# Patient Record
Sex: FEMALE | Race: WHITE | Employment: PART TIME | URBAN - METROPOLITAN AREA
[De-identification: names, ages, dates, MRNs, and addresses within clinical notes are randomized per-mention and may not be internally consistent; named-entity substitution may affect disease eponyms.]

---

## 2017-02-21 ENCOUNTER — OFFICE VISIT (OUTPATIENT)
Dept: CARDIOLOGY CLINIC | Age: 57
End: 2017-02-21

## 2017-02-21 VITALS
HEART RATE: 81 BPM | BODY MASS INDEX: 40.93 KG/M2 | OXYGEN SATURATION: 96 % | WEIGHT: 231 LBS | DIASTOLIC BLOOD PRESSURE: 68 MMHG | HEIGHT: 63 IN | SYSTOLIC BLOOD PRESSURE: 112 MMHG

## 2017-02-21 DIAGNOSIS — R00.2 PALPITATIONS: ICD-10-CM

## 2017-02-21 DIAGNOSIS — R07.9 CHEST PAIN, UNSPECIFIED TYPE: Primary | ICD-10-CM

## 2017-02-21 PROCEDURE — 93000 ELECTROCARDIOGRAM COMPLETE: CPT | Performed by: INTERNAL MEDICINE

## 2017-02-21 PROCEDURE — 99213 OFFICE O/P EST LOW 20 MIN: CPT | Performed by: INTERNAL MEDICINE

## 2017-03-23 ENCOUNTER — OFFICE VISIT (OUTPATIENT)
Dept: SLEEP MEDICINE | Age: 57
End: 2017-03-23

## 2017-03-23 VITALS
OXYGEN SATURATION: 98 % | HEIGHT: 63 IN | BODY MASS INDEX: 39.51 KG/M2 | DIASTOLIC BLOOD PRESSURE: 80 MMHG | WEIGHT: 223 LBS | RESPIRATION RATE: 16 BRPM | SYSTOLIC BLOOD PRESSURE: 130 MMHG | HEART RATE: 82 BPM

## 2017-03-23 DIAGNOSIS — G47.33 OSA (OBSTRUCTIVE SLEEP APNEA): Primary | ICD-10-CM

## 2017-03-23 DIAGNOSIS — G47.61 PLMD (PERIODIC LIMB MOVEMENT DISORDER): ICD-10-CM

## 2017-03-23 PROCEDURE — 99204 OFFICE O/P NEW MOD 45 MIN: CPT | Performed by: PSYCHIATRY & NEUROLOGY

## 2017-03-23 ASSESSMENT — SLEEP AND FATIGUE QUESTIONNAIRES
ESS TOTAL SCORE: 5
HOW LIKELY ARE YOU TO NOD OFF OR FALL ASLEEP IN A CAR, WHILE STOPPED FOR A FEW MINUTES IN TRAFFIC: 0
HOW LIKELY ARE YOU TO NOD OFF OR FALL ASLEEP WHEN YOU ARE A PASSENGER IN A CAR FOR AN HOUR WITHOUT A BREAK: 2
HOW LIKELY ARE YOU TO NOD OFF OR FALL ASLEEP WHILE SITTING QUIETLY AFTER LUNCH WITHOUT ALCOHOL: 0
HOW LIKELY ARE YOU TO NOD OFF OR FALL ASLEEP WHILE SITTING AND READING: 0
HOW LIKELY ARE YOU TO NOD OFF OR FALL ASLEEP WHILE WATCHING TV: 0
HOW LIKELY ARE YOU TO NOD OFF OR FALL ASLEEP WHILE SITTING AND TALKING TO SOMEONE: 0
HOW LIKELY ARE YOU TO NOD OFF OR FALL ASLEEP WHILE LYING DOWN TO REST IN THE AFTERNOON WHEN CIRCUMSTANCES PERMIT: 3
NECK CIRCUMFERENCE (INCHES): 17.25
HOW LIKELY ARE YOU TO NOD OFF OR FALL ASLEEP WHILE SITTING INACTIVE IN A PUBLIC PLACE: 0

## 2017-04-19 ENCOUNTER — HOSPITAL ENCOUNTER (OUTPATIENT)
Dept: OTHER | Age: 57
Discharge: HOME | End: 2017-04-20
Attending: PSYCHIATRY & NEUROLOGY | Admitting: PSYCHIATRY & NEUROLOGY

## 2017-04-19 DIAGNOSIS — G47.33 OSA (OBSTRUCTIVE SLEEP APNEA): ICD-10-CM

## 2017-04-19 DIAGNOSIS — G47.61 PLMD (PERIODIC LIMB MOVEMENT DISORDER): ICD-10-CM

## 2017-04-24 ENCOUNTER — TELEPHONE (OUTPATIENT)
Dept: PULMONOLOGY | Age: 57
End: 2017-04-24

## 2017-05-16 ENCOUNTER — HOSPITAL ENCOUNTER (OUTPATIENT)
Dept: OTHER | Age: 57
Discharge: HOME | End: 2017-05-18
Admitting: PSYCHIATRY & NEUROLOGY

## 2017-05-16 DIAGNOSIS — G47.33 OSA (OBSTRUCTIVE SLEEP APNEA): ICD-10-CM

## 2017-05-18 ENCOUNTER — TELEPHONE (OUTPATIENT)
Dept: PULMONOLOGY | Age: 57
End: 2017-05-18

## 2017-05-30 ENCOUNTER — TELEPHONE (OUTPATIENT)
Dept: PULMONOLOGY | Age: 57
End: 2017-05-30

## 2017-07-06 ENCOUNTER — OFFICE VISIT (OUTPATIENT)
Age: 57
End: 2017-07-06

## 2017-07-06 VITALS
DIASTOLIC BLOOD PRESSURE: 80 MMHG | BODY MASS INDEX: 38.95 KG/M2 | OXYGEN SATURATION: 98 % | HEIGHT: 63 IN | HEART RATE: 82 BPM | WEIGHT: 219.8 LBS | SYSTOLIC BLOOD PRESSURE: 132 MMHG

## 2017-07-06 DIAGNOSIS — R00.2 HEART PALPITATIONS: ICD-10-CM

## 2017-07-06 DIAGNOSIS — M79.602 LEFT ARM PAIN: Primary | ICD-10-CM

## 2017-07-06 DIAGNOSIS — I10 ESSENTIAL HYPERTENSION: ICD-10-CM

## 2017-07-06 PROCEDURE — 99214 OFFICE O/P EST MOD 30 MIN: CPT | Performed by: INTERNAL MEDICINE

## 2017-07-06 PROCEDURE — 93000 ELECTROCARDIOGRAM COMPLETE: CPT | Performed by: INTERNAL MEDICINE

## 2017-09-25 ENCOUNTER — SURG/PROC ORDERS (OUTPATIENT)
Dept: ANESTHESIOLOGY | Age: 57
End: 2017-09-25

## 2017-09-25 RX ORDER — SODIUM CHLORIDE 0.9 % (FLUSH) 0.9 %
10 SYRINGE (ML) INJECTION PRN
Status: CANCELLED | OUTPATIENT
Start: 2017-09-25

## 2017-09-25 RX ORDER — SODIUM CHLORIDE 0.9 % (FLUSH) 0.9 %
10 SYRINGE (ML) INJECTION EVERY 12 HOURS SCHEDULED
Status: CANCELLED | OUTPATIENT
Start: 2017-09-25

## 2017-09-25 RX ORDER — SODIUM CHLORIDE 9 MG/ML
INJECTION, SOLUTION INTRAVENOUS CONTINUOUS
Status: CANCELLED | OUTPATIENT
Start: 2017-09-25

## 2017-10-03 ENCOUNTER — TELEPHONE (OUTPATIENT)
Dept: CARDIOLOGY CLINIC | Age: 57
End: 2017-10-03

## 2017-10-03 DIAGNOSIS — R00.2 HEART PALPITATIONS: Primary | ICD-10-CM

## 2017-10-03 NOTE — TELEPHONE ENCOUNTER
Dr. Robert Yates, you saw her in July. Known palpitations with the arm numbness. You did not think that a significant arrhythmia was at play. On cardizem. Would you like to continue to continue to monitor at this time? I saw lots of testing but no monitor. Would you like to order a monitor? Increase cardizem? Advise.

## 2017-10-03 NOTE — TELEPHONE ENCOUNTER
Yoon Romero called in this morning stating that she has been having irregular hr, numbness in her left arm, and she has had 2 incidents where she feels like \"her heart stops and starts again. \"  I discussed it over the phone with Oneta Frankel and the feeling of her  \"heart stopping and starting again\" is the palpitations. From Dr. Zoey Curry note in July he wanted her to continue taking the Diltiazem which her PCP started her on in June after a surgery she had, she started to have high bp. Diltiazem is not on the patients medication list or in the history of her list.  I asked the patient if she was still taking the Diltiazem and she stated that yes, she takes 120 mg daily. Yoon Romero can be reached at [phone], she will be there until 2:40pm today and then she has to pick her niece up from school.

## 2017-10-06 ENCOUNTER — TELEPHONE (OUTPATIENT)
Dept: CARDIOLOGY CLINIC | Age: 57
End: 2017-10-06

## 2017-10-06 NOTE — TELEPHONE ENCOUNTER
Bladder scan results = 820 ml.   Patient states that it stopped at 24 hours ? ( Had Yudi GARIBAY help with the holter on 10. 5.17 and it was set for 48 hours) patient states that she really doesn't want to wear it over the weekend as she has a wedding and wondering if maybe enough information captured or does Dr Kasia Black want 48 hours?

## 2017-10-16 ENCOUNTER — TELEPHONE (OUTPATIENT)
Dept: CARDIOLOGY CLINIC | Age: 57
End: 2017-10-16

## 2017-10-17 PROCEDURE — 93228 REMOTE 30 DAY ECG REV/REPORT: CPT | Performed by: INTERNAL MEDICINE

## 2017-10-17 NOTE — TELEPHONE ENCOUNTER
Patient has been informed that Holter Results are normal per [facility]

## 2017-10-17 NOTE — TELEPHONE ENCOUNTER
Dropped Holter off 1 week ago.   Waiting for Dr Karen Gloria to view results,  Patient has been informed

## 2017-10-24 DIAGNOSIS — R00.2 HEART PALPITATIONS: Primary | ICD-10-CM

## 2017-11-29 ENCOUNTER — TELEPHONE (OUTPATIENT)
Dept: CARDIOLOGY CLINIC | Age: 57
End: 2017-11-29

## 2017-11-29 NOTE — TELEPHONE ENCOUNTER
Patient was in the ED last night with a fever and chest tightness. Per pt the ED doctor told her that her EKG came back abnormal, but states he said there was nothing to worry about. Patient would like Dr. Thien Mccollum to look at her EKG. You can reach the patient at [phone].

## 2017-11-29 NOTE — TELEPHONE ENCOUNTER
Last appt on 7.6. 16 with Dr Hipolito Hinton:    Palpitations  Arm pain  HTN    At that visit, entirely stable from a cardiac standpoint    Seen in ED on 11/28/17 for chest pain. She is feeling better today ( out running errands)    Would like Dr Hipolito Hinton to take a look at her EKG.

## 2017-11-29 NOTE — TELEPHONE ENCOUNTER
Called patient and spoke with family member who said she was out running errands. One of the nurses can call patient tomorrow to get an update on how she is feeling and try to schedule f/u appt. EKG showed prolonged QT.

## 2017-11-30 NOTE — TELEPHONE ENCOUNTER
I reviewed with Dr. Aviva Smith. No concerns on his end. The EKG is fine. Continue her current medications.  Keep the plan for 1 yr follow up

## 2017-12-26 ENCOUNTER — TELEPHONE (OUTPATIENT)
Dept: CARDIOLOGY CLINIC | Age: 57
End: 2017-12-26

## 2017-12-26 NOTE — TELEPHONE ENCOUNTER
She can take an additional Cardizem dose if she needs to. I doubt anything significant is going on. We can see her in follow-up in the next few weeks if need be. I would reassure her.

## 2017-12-26 NOTE — TELEPHONE ENCOUNTER
Spoke with patient and relayed recommendation per Dr. Brenden Crawford, pt reports that she feels much better and symptoms have calmed down, she is scheduled to see Dr. Brenden Crawford on 1/26/18

## 2017-12-26 NOTE — TELEPHONE ENCOUNTER
Spoke with patient, she relates that her her current HR is 80 but she notices that it goes down b/w 69-70 after 5 minutes and she also does not think her Diltiazem is working , she had previously worn a 48 hour holter monitor. She denies any other cardiac symptoms besides the skipped beats. She has a pulse oximeter which she uses to measure her HR .  Please review and advise

## 2017-12-26 NOTE — TELEPHONE ENCOUNTER
Ej     Please return call to patient and reassure her she is okay. Instruct her she may take an extra Cardizem dose. Dr. Aviva Smith can see her in the next few weeks if she feels she needs to be seen.

## 2017-12-26 NOTE — TELEPHONE ENCOUNTER
EP/Palpitations/Fast Heart Rate:    When did your symptoms start? Last night around 11pm    Are your symptoms constant? Or do they come and go? Patient states her heart is skipping beats about every 8-10 beats    Is this the first time that it has happened? no    Does anything make it better or worse? Pt noticed it started to skip beats after drinking caffeine at 10:30pm    Do you feel dizzy? no    Do you have chest pain or shortness of breath? no    Can you tell me what your heart rate is? Pt took bp this morning it was 136/71 pulse 74    Patient can be reached at [phone].

## 2018-01-11 ENCOUNTER — HOSPITAL ENCOUNTER (OUTPATIENT)
Dept: ENDOSCOPY | Age: 58
Discharge: HOME | End: 2018-01-30
Attending: INTERNAL MEDICINE | Admitting: INTERNAL MEDICINE

## 2018-01-26 ENCOUNTER — OFFICE VISIT (OUTPATIENT)
Dept: CARDIOLOGY CLINIC | Age: 58
End: 2018-01-26

## 2018-01-26 VITALS
WEIGHT: 232.13 LBS | HEART RATE: 74 BPM | BODY MASS INDEX: 41.13 KG/M2 | HEIGHT: 63 IN | DIASTOLIC BLOOD PRESSURE: 86 MMHG | SYSTOLIC BLOOD PRESSURE: 134 MMHG | OXYGEN SATURATION: 98 %

## 2018-01-26 DIAGNOSIS — R00.2 HEART PALPITATIONS: Primary | ICD-10-CM

## 2018-01-26 DIAGNOSIS — I10 ESSENTIAL HYPERTENSION: ICD-10-CM

## 2018-01-26 PROCEDURE — 99213 OFFICE O/P EST LOW 20 MIN: CPT | Performed by: INTERNAL MEDICINE

## 2018-01-26 PROCEDURE — G8484 FLU IMMUNIZE NO ADMIN: HCPCS | Performed by: INTERNAL MEDICINE

## 2018-01-26 PROCEDURE — G8417 CALC BMI ABV UP PARAM F/U: HCPCS | Performed by: INTERNAL MEDICINE

## 2018-01-26 PROCEDURE — G8427 DOCREV CUR MEDS BY ELIG CLIN: HCPCS | Performed by: INTERNAL MEDICINE

## 2018-01-26 PROCEDURE — 93000 ELECTROCARDIOGRAM COMPLETE: CPT | Performed by: INTERNAL MEDICINE

## 2018-01-26 PROCEDURE — 3014F SCREEN MAMMO DOC REV: CPT | Performed by: INTERNAL MEDICINE

## 2018-01-26 PROCEDURE — 1036F TOBACCO NON-USER: CPT | Performed by: INTERNAL MEDICINE

## 2018-01-26 PROCEDURE — 3017F COLORECTAL CA SCREEN DOC REV: CPT | Performed by: INTERNAL MEDICINE

## 2018-01-26 NOTE — PROGRESS NOTES
[address]    Santi Gordon  1960 January 26, 2018    CC: Palpitations    The patient is 61 y.o. female with a past medical history significant for palpitations (years)-echo and stress normal and was managed per Dr. Les Real. She presents today to follow up for her palpitations. Since we last saw Santi Mane she reports that she has been feeling okay. She did have an episode of palpitations a month or two ago. We advised her to take an extra Cardizem tablet. She states that this \"worked perfectly\". There has been no chest or arm pain. She states she has been trying to be more active but has not been swimming or walking. Review of Systems:  Denies any CP, pressure, tightness, heaviness, SOB, FRANKLIN, PND or orthopnea. Denies any cough productive or otherwise. No recent change in weight. No recent changes in bowel or bladder habits. No easy bleeding or bruising. Denies any arthralgias or myalgias. Review of systems is negative to a 12 point review except as noted above. Allergies   Allergen Reactions    Sulfa Antibiotics      Current Outpatient Prescriptions   Medication Sig Dispense Refill    diltiazem (CARTIA XT) 120 MG extended release capsule Take 1 capsule by mouth daily      Probiotic Product (PROBIOTIC-10) CAPS Take 1 tablet by mouth daily      docusate sodium (COLACE) 100 MG capsule Take 100 mg by mouth 2 times daily      omeprazole (PRILOSEC) 20 MG capsule Take 20 mg by mouth daily       No current facility-administered medications for this visit. Physical Exam:   Vitals:    01/26/18 0950   BP: 134/86   Pulse: 74   SpO2: 98%     Wt Readings from Last 2 Encounters:   01/26/18 232 lb 2 oz (105.3 kg)   11/28/17 231 lb 11.3 oz (105.1 kg)     Constitutional: She is oriented to person, place, and time. She appears well-developed and well-nourished. In no acute distress. Head: Normocephalic and atraumatic. Neck: Neck supple. No JVD present.  Carotid bruit

## 2018-01-26 NOTE — PATIENT INSTRUCTIONS
health, and be sure to contact your doctor if:  ? · You would like help planning heart-healthy meals. Where can you learn more? Go to [email or web address] and sign in to your [facility] account. Enter V137 in the boomtrain box to learn more about \"Heart-Healthy Diet: Care Instructions. \"     If you do not have an account, please click on the \"Sign Up Now\" link. Current as of: September 21, 2016  Content Version: 11.5  © 8732-2333 Healthwise, Incorporated. Care instructions adapted under license by [facility] ([facility]). If you have questions about a medical condition or this instruction, always ask your healthcare professional. [address] any warranty or liability for your use of this information.

## 2018-09-26 PROBLEM — E55.9 VITAMIN D DEFICIENCY: Status: ACTIVE | Noted: 2018-09-26

## 2018-09-26 PROBLEM — M54.50 CHRONIC RIGHT-SIDED LOW BACK PAIN WITHOUT SCIATICA: Status: ACTIVE | Noted: 2018-09-26

## 2018-09-26 PROBLEM — G89.29 CHRONIC RIGHT-SIDED LOW BACK PAIN WITHOUT SCIATICA: Status: ACTIVE | Noted: 2018-09-26

## 2018-09-26 PROBLEM — R63.5 WEIGHT GAIN: Status: ACTIVE | Noted: 2018-09-26

## 2018-09-26 PROBLEM — I10 ESSENTIAL HYPERTENSION: Status: ACTIVE | Noted: 2018-09-26

## 2018-10-03 ENCOUNTER — HOSPITAL ENCOUNTER (OUTPATIENT)
Age: 58
Discharge: HOME | End: 2018-10-03
Payer: MEDICAID

## 2018-10-03 VITALS — BODY MASS INDEX: 43.94 KG/M2 | WEIGHT: 248 LBS | HEIGHT: 63 IN

## 2018-10-03 PROCEDURE — 97802 MEDICAL NUTRITION INDIV IN: CPT

## 2018-10-03 NOTE — PROGRESS NOTES
NUTRITION THERAPY ASSESSMENT     Referring Physician: Bravo Ghosh M.D.  PCP: Bravo Ghosh M.D. Referring diagnosis: weight loss     Dominique Torres is a 60 y.o. female who presented to the office with strong desire to lose weight. She reported that she started gaining weight mostly in her 52's. Eating habits have not changed since then but her activity level has decreased d/t back pain which has limited her walking which she use to do daily. Per diet recall, pt rarely cooks meals and prepares cereal for two meals/day. Pt eats out at restaurants 1 -2 times per day and occasionally 3 times a day on weekend days. Pt said she drank pop frequently but has cut back and only drinks 1-20 oz pop over the course of 3 days. Pt does work 5p-10p and doesn't get a \"lunch break\" but quickly grabs snacks out of the vending machine. Pt did states she was pre-diabetic and educated pt briefly on the plate method to help reduce carb intakes. Educated pt on weight loss tips, nutrition label reading, healthful snacks, and portion sizes. Encouraged pt to use [employer] manuel to log food. Discussed healthy options at restaurants and tips to help eat healthy while out. Encouraged pt to start meal planning and cooking meals at home to reduce amount of times eating out of the house. Pt able to say what she was going to try such as prepping healthy snacks to take to work instead of going to vending machines. Pt very excited to start meal planning, tracking food, and eating healthy. I believe pt would benefit from follow-up appointment to monitor progress and keep her accountable. OBJECTIVE DATA:  Past Medical History:   Diagnosis Date    Acid reflux     Arthritis     knees    Heart palpitations 2016    Osteoarthritis     Sleep apnea        Labs:  No results for input(s): NA, K, CL, CO2, BUN, CREATININE, GLUCOSE in the last 72 hours.     Invalid input(s): CA  No results found for: PHOS    Lab Results   Component Value Date    LABALBU 4.1 09/26/2018      Lab Results   Component Value Date    TRIG 77 09/26/2018    HDL 50 09/26/2018    LDLCALC 85 09/26/2018    LABVLDL 15 09/26/2018     No results found for: LABA1C  No results for input(s): AST, ALT, ALB, BILIDIR, BILITOT, ALKPHOS in the last 72 hours. Anthropometric Measures   Height: 5' 3\" (160 cm)   Current Weight: Weight: 248 lb (112.5 kg)     Usual Body Weight: 250#       Ideal Body Weight: 115 # +/- 10%  % Ideal Body Weight: 217%       Body mass index is 43.93 kg/m². Less than18.5 Underweight  18.5-24.9 Normal Weight  25-29.9 Overweight  30-34.9 Obese Class I  35-39.9 Obese Class II  >or equal to 40 Obese Class III  Wt Readings from Last 50 Encounters:   10/03/18 248 lb (112.5 kg)   09/26/18 249 lb (112.9 kg)   01/26/18 232 lb 2 oz (105.3 kg)   11/28/17 231 lb 11.3 oz (105.1 kg)   07/06/17 219 lb 12.8 oz (99.7 kg)   03/23/17 223 lb (101.2 kg)   03/04/17 229 lb (103.9 kg)   02/21/17 231 lb (104.8 kg)   10/10/16 224 lb 6.4 oz (101.8 kg)   10/06/16 224 lb 12.8 oz (102 kg)   08/18/15 190 lb (86.2 kg)       Comparative Standards (not pertinent at this time)    Nutrition-focused Physical Findings           Skin:  no issues noted  Chewing / Swallowing:  no issues reported    Food/Nutrition-Related History  Home Diet: General diet  Home Supplements / Herbals: none reported     Smoker: no  Consumes Alcohol: no    Physical Activity  Physical activity limited d/t pain in her back. Pt states she is seeing a doctor about it soon.      24 Hour Diet Recall  Breakfast- Grape nut cereal w/ 2% milk, juice, toast w/ butter  Lunch- [place] small 3 way, cheese coney, pepsi, water  Snack- Chips  Dinner- Grape nut cereal w/ 2% milk or PB crackers, pop    NUTRITION DIAGNOSIS and GOAL  P: Overweight / obesity  E related to Knowledge deficit  S: as evidenced by BMI- 44, excessive energy intake      Nutrition Intervention:  - Nutrition Education: Educated pt on plate methods, weight loss tips, meal/snack planning,

## 2018-12-17 PROBLEM — J06.9 VIRAL UPPER RESPIRATORY TRACT INFECTION: Status: ACTIVE | Noted: 2018-12-17

## 2018-12-17 PROBLEM — R73.9 HYPERGLYCEMIA: Status: ACTIVE | Noted: 2018-12-17

## 2019-02-08 ENCOUNTER — TELEPHONE (OUTPATIENT)
Dept: CARDIOLOGY CLINIC | Age: 59
End: 2019-02-08

## 2019-02-28 ENCOUNTER — OFFICE VISIT (OUTPATIENT)
Dept: CARDIOLOGY CLINIC | Age: 59
End: 2019-02-28
Payer: MEDICAID

## 2019-02-28 VITALS
SYSTOLIC BLOOD PRESSURE: 128 MMHG | HEART RATE: 89 BPM | BODY MASS INDEX: 44.83 KG/M2 | OXYGEN SATURATION: 99 % | WEIGHT: 253 LBS | DIASTOLIC BLOOD PRESSURE: 82 MMHG | HEIGHT: 63 IN

## 2019-02-28 DIAGNOSIS — I10 ESSENTIAL HYPERTENSION: ICD-10-CM

## 2019-02-28 DIAGNOSIS — R00.2 HEART PALPITATIONS: Primary | ICD-10-CM

## 2019-02-28 PROCEDURE — G8427 DOCREV CUR MEDS BY ELIG CLIN: HCPCS | Performed by: INTERNAL MEDICINE

## 2019-02-28 PROCEDURE — G8417 CALC BMI ABV UP PARAM F/U: HCPCS | Performed by: INTERNAL MEDICINE

## 2019-02-28 PROCEDURE — 3017F COLORECTAL CA SCREEN DOC REV: CPT | Performed by: INTERNAL MEDICINE

## 2019-02-28 PROCEDURE — 99213 OFFICE O/P EST LOW 20 MIN: CPT | Performed by: INTERNAL MEDICINE

## 2019-02-28 PROCEDURE — 1036F TOBACCO NON-USER: CPT | Performed by: INTERNAL MEDICINE

## 2019-02-28 PROCEDURE — G8482 FLU IMMUNIZE ORDER/ADMIN: HCPCS | Performed by: INTERNAL MEDICINE

## 2019-02-28 PROCEDURE — 93000 ELECTROCARDIOGRAM COMPLETE: CPT | Performed by: INTERNAL MEDICINE

## 2019-03-31 ENCOUNTER — HOSPITAL ENCOUNTER (OUTPATIENT)
Age: 59
Discharge: HOME | End: 2019-03-31
Payer: MEDICAID

## 2019-03-31 ENCOUNTER — HOSPITAL ENCOUNTER (OUTPATIENT)
Dept: GENERAL RADIOLOGY | Age: 59
Discharge: HOME | End: 2019-03-31
Payer: MEDICAID

## 2019-03-31 DIAGNOSIS — M25.511 ACUTE PAIN OF RIGHT SHOULDER: ICD-10-CM

## 2019-03-31 PROCEDURE — 73060 X-RAY EXAM OF HUMERUS: CPT

## 2019-04-01 ENCOUNTER — OFFICE VISIT (OUTPATIENT)
Dept: ORTHOPEDIC SURGERY | Age: 59
End: 2019-04-01
Payer: MEDICAID

## 2019-04-01 VITALS
BODY MASS INDEX: 44.47 KG/M2 | HEIGHT: 63 IN | WEIGHT: 251 LBS | DIASTOLIC BLOOD PRESSURE: 87 MMHG | SYSTOLIC BLOOD PRESSURE: 159 MMHG | HEART RATE: 86 BPM

## 2019-04-01 DIAGNOSIS — S42.251A CLOSED DISPLACED FRACTURE OF GREATER TUBEROSITY OF RIGHT HUMERUS, INITIAL ENCOUNTER: Primary | ICD-10-CM

## 2019-04-01 PROCEDURE — G8417 CALC BMI ABV UP PARAM F/U: HCPCS | Performed by: ORTHOPAEDIC SURGERY

## 2019-04-01 PROCEDURE — 23620 CLTX GR HMRL TBRS FX WO MNPJ: CPT | Performed by: ORTHOPAEDIC SURGERY

## 2019-04-01 PROCEDURE — G8427 DOCREV CUR MEDS BY ELIG CLIN: HCPCS | Performed by: ORTHOPAEDIC SURGERY

## 2019-04-01 PROCEDURE — 3017F COLORECTAL CA SCREEN DOC REV: CPT | Performed by: ORTHOPAEDIC SURGERY

## 2019-04-01 PROCEDURE — 99243 OFF/OP CNSLTJ NEW/EST LOW 30: CPT | Performed by: ORTHOPAEDIC SURGERY

## 2019-04-01 RX ORDER — ACETAMINOPHEN 500 MG
1000 TABLET ORAL EVERY 6 HOURS PRN
COMMUNITY
End: 2022-06-14 | Stop reason: ALTCHOICE

## 2019-04-01 RX ORDER — IBUPROFEN 200 MG
800 TABLET ORAL EVERY 6 HOURS PRN
COMMUNITY
End: 2019-12-24 | Stop reason: CLARIF

## 2019-04-01 NOTE — PROGRESS NOTES
Onur Nelson  [id number]  April 1, 2019    Chief Complaint   Patient presents with   Ashley Goins Shoulder Injury     Slipped and fell onto Right shoulder; doi 3/31/19. History: The patient is a 69-year-old female here today for evaluation regarding her right shoulder pain. The patient reportedly was walking her dog when she slipped on some mud and fell onto her right side on 3/31/19. She ultimately presented to the ER and had x-rays taken and was placed in a sling. She has been using ibuprofen and Tylenol intermittently for pain control. She denies any numbness or tingling. She is right-hand dominant. She has not been able to return to work as a  at [employer]. This is a consult for right shoulder pain from Jeremy Lopez MD    The patient's  past medical history, medications, allergies,  family history, social history, and review of systems have been reviewed, and dated and are recorded in the chart. BP (!) 159/87   Pulse 86   Ht 5' 3\" (1.6 m)   Wt 251 lb (113.9 kg)   LMP 08/04/2015   BMI 44.46 kg/m²     Physical: Ms. Onur Nelson appears well, she is in no apparent distress, she demonstrates appropriate mood & affect. Examination of the right shoulder reveals moderate tenderness at the fracture site. Range of motion of the right elbow is from -10 degrees to 135 degrees. Range of motion of the right shoulder is painful. Examination of the skin reveals normal color,texture and turgor. There are no rashes or lesions. There is no evidence of gross joint instability bilaterally. Sensation is normal in the hands. Vascular examination reveals strong palpable pulses and brisk capillary refill. There is mild swelling in the affected wrist.  There is not clinical evidence of skeletal deformity or mal-alignment   Muscular strength is clinically appropriate bilaterally. X-Rays: 2 views of her shoulder obtained in the ER reviewed in the office today.   There is evidence of a proximal humerus fracture involving the greater tuberosity with mild displacement of the greater tuberosity. Impression: right proximal humerus fracture    Plan: At this time the patient was instructed to work aggressively on range of motion of the wrist,hand. She may begin working on range of motion of the elbow in 1 week. The patient will also begin light range of motion of the shoulder. We discussed the continued restrictions on the use of the injured shoulder and the limitations on resumption of activities until full range of motion and comfort have been regained. I have explained the time course and likely expectations for maximal recovery of motion and function. We discussed the option of pursuing formalized therapy and a prescription  was not indicated. Follow up will be in 2 week(s) and repeat x-rays will be obtained.

## 2019-04-07 ENCOUNTER — HOSPITAL ENCOUNTER (OUTPATIENT)
Age: 59
Discharge: HOME | End: 2019-04-07
Payer: MEDICAID

## 2019-04-07 ENCOUNTER — HOSPITAL ENCOUNTER (OUTPATIENT)
Dept: GENERAL RADIOLOGY | Age: 59
Discharge: HOME | End: 2019-04-07
Payer: MEDICAID

## 2019-04-07 DIAGNOSIS — M25.562 ACUTE PAIN OF LEFT KNEE: ICD-10-CM

## 2019-04-07 PROCEDURE — 73560 X-RAY EXAM OF KNEE 1 OR 2: CPT

## 2019-04-15 ENCOUNTER — OFFICE VISIT (OUTPATIENT)
Dept: ORTHOPEDIC SURGERY | Age: 59
End: 2019-04-15
Payer: MEDICAID

## 2019-04-15 VITALS
SYSTOLIC BLOOD PRESSURE: 148 MMHG | WEIGHT: 251 LBS | DIASTOLIC BLOOD PRESSURE: 84 MMHG | HEART RATE: 84 BPM | HEIGHT: 63 IN | BODY MASS INDEX: 44.47 KG/M2

## 2019-04-15 DIAGNOSIS — M17.12 PRIMARY OSTEOARTHRITIS OF LEFT KNEE: ICD-10-CM

## 2019-04-15 DIAGNOSIS — S42.91XD CLOSED FRACTURE OF RIGHT SHOULDER WITH ROUTINE HEALING, SUBSEQUENT ENCOUNTER: Primary | ICD-10-CM

## 2019-04-15 PROCEDURE — 1036F TOBACCO NON-USER: CPT | Performed by: ORTHOPAEDIC SURGERY

## 2019-04-15 PROCEDURE — 3017F COLORECTAL CA SCREEN DOC REV: CPT | Performed by: ORTHOPAEDIC SURGERY

## 2019-04-15 PROCEDURE — G8417 CALC BMI ABV UP PARAM F/U: HCPCS | Performed by: ORTHOPAEDIC SURGERY

## 2019-04-15 PROCEDURE — L3660 SO 8 AB RSTR CAN/WEB PRE OTS: HCPCS | Performed by: ORTHOPAEDIC SURGERY

## 2019-04-15 PROCEDURE — G8427 DOCREV CUR MEDS BY ELIG CLIN: HCPCS | Performed by: ORTHOPAEDIC SURGERY

## 2019-04-15 PROCEDURE — 20610 DRAIN/INJ JOINT/BURSA W/O US: CPT | Performed by: ORTHOPAEDIC SURGERY

## 2019-04-15 PROCEDURE — 99213 OFFICE O/P EST LOW 20 MIN: CPT | Performed by: ORTHOPAEDIC SURGERY

## 2019-04-15 RX ORDER — METHYLPREDNISOLONE ACETATE 40 MG/ML
80 INJECTION, SUSPENSION INTRA-ARTICULAR; INTRALESIONAL; INTRAMUSCULAR; SOFT TISSUE ONCE
Status: COMPLETED | OUTPATIENT
Start: 2019-04-15 | End: 2019-04-15

## 2019-04-15 RX ADMIN — METHYLPREDNISOLONE ACETATE 80 MG: 40 INJECTION, SUSPENSION INTRA-ARTICULAR; INTRALESIONAL; INTRAMUSCULAR; SOFT TISSUE at 10:02

## 2019-04-15 NOTE — LETTER
[facility]  [address] [place]   Phone: [phone]  Fax: [phone]    Davie Whiteside MD        April 15, 2019     Patient: Alireza Judge   YOB: 1960   Date of Visit: 4/15/2019       To Whom It May Concern: It is my medical opinion that Alireza Judge should remain out of work until her follow up in 2 weeks. If you have any questions or concerns, please don't hesitate to call.     Sincerely,          Davie Whiteside MD

## 2019-04-15 NOTE — PROGRESS NOTES
Frances Whelan  [id number]  April 15, 2019    Chief Complaint   Patient presents with   Rai Shoulder Pain     Humerus Fracture follow up DOI 03/31/2019    Knee Pain     Began 04/07/2019           History: The patient is here in follow-up regarding her right shoulder. Her pain is gradually improved. She is having moderate severe left knee pain since her fall. She did present to the emergency room a few days ago and x-rays were obtained. She does have a history of left knee arthritis and has received injections in the remote past.  She has difficulty with stairs. The patient's  past medical history, medications, allergies,  family history, social history, and review of systems have been reviewed, and dated and are recorded in the chart. BP (!) 148/84   Pulse 84   Ht 5' 3\" (1.6 m)   Wt 251 lb (113.9 kg)   LMP 08/04/2015   BMI 44.46 kg/m²     Physical: Ms. Frances Whelan appears well, she is in no apparent distress, she demonstrates appropriate mood & affect. Examination of the right shoulder reveals moderate tenderness at the fracture site. Range of motion of the right elbow is from -5 degrees to 140 degrees. Range of motion of the right shoulder is mildly painful. Examination of the skin reveals normal color,texture and turgor. There are no rashes or lesions. There is no evidence of gross joint instability bilaterally. Sensation is normal in the hands. Vascular examination reveals strong palpable pulses and brisk capillary refill. There is mild swelling in the affected wrist.  There is not clinical evidence of skeletal deformity or mal-alignment   Muscular strength is clinically appropriate bilaterally. Ms. Frances Whelan appears well, she is in no apparent distress, she demonstrates appropriate mood & affect. She is alert and oriented to person, place and time. Examination of the left lower extremity reveals no pain with range of motion of the hip.  She has generalized tenderness to palpation about the joint line of the left knee. Range of motion is from -2 degrees to 115 degrees. Strength is 4+ to 5/5 for all muscle groups about the left knee. There is patellofemoral crepitus with range of motion of the left knee. Varus and valgus stressing of the knees reveals no evidence of instability. There is a small effusion in the left knee. Anterior drawer and Lachman are negative bilaterally. Examination of the skin reveals no rashes, ulceration, or lesion, bilaterally in the lower extremities. Sensation to both lower extremities is grossly intact. Exam of both feet reveals pedal pulses intact and brisk cap refill. Patient is able to dorsiflex and wiggle all toes. Deep tendon reflexes of the lower extremities are normal and symmetric. X-Rays: 2 views of her right shoulder obtained in the office today were extensively reviewed. The greater tuberosity fracture is in good position. There has been no further displacement. The surgical neck component is well aligned. 2 views of the left knee obtained in the emergency room on 4/7/2019 were extensively reviewed. The x-rays were nonweightbearing. She has moderate to severe osteoarthritis. Impression: right proximal humerus fracture #2 left knee osteoarthritis    Plan: At this time, the left knee was injected under sterile conditions. After a Betadine prep of the joint, 3 cc of 0.25% Marcaine and 2 cc of 40 mg Depo-medrol were injected into the knee. The patient tolerated the injection rather well. The patient was instructed to follow up for any signs of infection. Natural history and expected course discussed. Questions answered. Reduction in offending activity. OTC analgesics as needed. The patient will follow up with me in 2 weeks. At follow-up, 3 views of the right shoulder will be obtained. We will then consider a formal therapy program for the right shoulder. She is to remain nonweightbearing with the right upper extremity.

## 2019-04-29 ENCOUNTER — OFFICE VISIT (OUTPATIENT)
Dept: ORTHOPEDIC SURGERY | Age: 59
End: 2019-04-29

## 2019-04-29 ENCOUNTER — TELEPHONE (OUTPATIENT)
Dept: ORTHOPEDIC SURGERY | Age: 59
End: 2019-04-29

## 2019-04-29 VITALS
BODY MASS INDEX: 44.47 KG/M2 | SYSTOLIC BLOOD PRESSURE: 149 MMHG | DIASTOLIC BLOOD PRESSURE: 78 MMHG | HEART RATE: 74 BPM | HEIGHT: 63 IN | WEIGHT: 251 LBS

## 2019-04-29 DIAGNOSIS — S42.91XD CLOSED FRACTURE OF RIGHT SHOULDER WITH ROUTINE HEALING, SUBSEQUENT ENCOUNTER: Primary | ICD-10-CM

## 2019-04-29 PROCEDURE — 99024 POSTOP FOLLOW-UP VISIT: CPT | Performed by: ORTHOPAEDIC SURGERY

## 2019-04-29 NOTE — PROGRESS NOTES
[place] exercises for the right shoulder. She will also begin a physical therapy program for the right shoulder to work on passive range of motion. The patient was advised was advised to not drive.

## 2019-04-29 NOTE — TELEPHONE ENCOUNTER
Patient called states that she has misplaced her papers with instructions. She is requesting a call back with those instructions.     Please call patient to advise:  [phone]

## 2019-04-29 NOTE — TELEPHONE ENCOUNTER
I spoke with the patient and informed her that her PT script can be accessed by the therapist through her chart.

## 2019-05-02 ENCOUNTER — HOSPITAL ENCOUNTER (OUTPATIENT)
Dept: PHYSICAL THERAPY | Age: 59
Setting detail: THERAPIES SERIES
Discharge: HOME | End: 2019-05-02
Payer: MEDICAID

## 2019-05-02 PROCEDURE — 97110 THERAPEUTIC EXERCISES: CPT

## 2019-05-02 PROCEDURE — 97140 MANUAL THERAPY 1/> REGIONS: CPT

## 2019-05-02 PROCEDURE — 97162 PT EVAL MOD COMPLEX 30 MIN: CPT

## 2019-05-02 NOTE — PLAN OF CARE
Outpatient Physical Therapy  [] [facility]    Phone: [phone]   Fax: [phone]   [x] [facility]  Phone: [phone]              Fax: [phone]  [] [facility]   Phone: [phone]   Fax: [phone]     To: Referring Practitioner: Dr. Juan Carlos Rico      Patient: Sim Moritz   : 1960   MRN: [id number]  Evaluation Date: 2019      Diagnosis Information:  · Diagnosis: Fracture of R shoulder   · Treatment Diagnosis: Pain. Decreased R shoulder ROM and strength. Decreased R elbow ROM     Physical Therapy Certification Form  Dear Dr. Juan Carlos Rico,  The following patient has been evaluated for physical therapy services and for therapy to continue, Medicare requires monthly physician review of the treatment plan. Please review the attached evaluation and/or summary of the patient's plan of care, and verify that you agree therapy should continue by signing the attached document and sending it back to our office. Plan of Care/Treatment to date:  [x] Therapeutic Exercise    [x] Modalities:  [x] Therapeutic Activity     [] Ultrasound  [] Electrical Stimulation  [] Gait Training      [] Cervical Traction [] Lumbar Traction  [] Neuromuscular Re-education    [x] Cold/hotpack [] Iontophoresis   [x] Instruction in HEP     Other:  [x] Manual Therapy      []             [] Aquatic Therapy      []           ? Frequency/Duration:  # Days per week: [] 1 day # Weeks: [] 1 week [] 5 weeks     [x] 2 days? [] 2 weeks [] 6 weeks     [] 3 days   [] 3 weeks [] 7 weeks     [] 4 days   [] 4 weeks [x] 8 weeks    Rehab Potential: [] Excellent [x] Good [] Fair  [] Poor       Electronically signed by:  Liborio Culp PT      If you have any questions or concerns, please don't hesitate to call.   Thank you for your referral.      Physician Signature:________________________________Date:__________________  By signing above, therapists plan is approved by physician

## 2019-05-02 NOTE — FLOWSHEET NOTE
tolerated treatment well [] Patient limited by fatigue  [x] Patient limited by pain  [] Patient limited by other medical complications  [] Other:         Prognosis: [x] Good [] Fair  [] Poor    Patient Requires Follow-up: [x] Yes  [] No    Plan:   [] Continue per plan of care [] Alter current plan (see comments)  [x] Plan of care initiated [] Hold pending MD visit [] Discharge  Plan for Next Session:   5/2/19: Per Dr. Dory Tobin script: Work on R shoulder PROM, Elbow ROM-aggressive  Modalities: CP,estim for pain     Electronically signed by:  Sarah justice PT

## 2019-05-02 NOTE — PROGRESS NOTES
Physical Therapy  Initial Assessment  Date: 2019  Patient Name: Cristina Lynch  MRN: [id number]  : 1960     Treatment Diagnosis: Pain. Decreased R shoulder ROM and strength. Decreased R elbow ROM    Restrictions  Restrictions/Precautions  Restrictions/Precautions: Fall Risk(low)  Position Activity Restriction  Other position/activity restrictions: No AROM or use of R shoulder due to shoulder fracture. PROM only    Subjective   General  Chart Reviewed: Yes  Referring Practitioner: Dr. Nick Townsend  Referral Date : 19  Diagnosis: Fracture of R shoulder  PT Visit Information  Onset Date: 19  PT Insurance Information: [facility]  Subjective  Subjective: Pt states she fell on 3/31/19 and broke her R shoulder. States she did not have to get surgery. Recently saw the MD and he wants her to wean out of the sling, only use it when in public. Pain 5/10. States sometimes it doesnt hurt. States her hand and elbow are stiff, has trouble straightening her R elbow. Vision/Hearing  Vision  Vision: Within Functional Limits  Hearing  Hearing: Within functional limits    Orientation  Orientation  Overall Orientation Status: Within Normal Limits    Objective     Observation/Palpation  Observation: pt guarding of R shoulder and elbow, swelling in R elbow and into her R hand  Edema: Moderate in R elbow and wrist    PROM RUE (degrees)  RUE PROM: WNL  RUE General PROM: Except Shoulder Flexion 30 deg, Abduction 50 deg, ER 25 deg, IR 40 deg. Wrist Supination limited 50%  AROM RUE (degrees)  RUE General AROM: Shoulder Not Tested. Elbow limited 25% in extension and flexion.  Wrist limited 25% in flexion,extension, and supination/pronation limited 25%  AROM LUE (degrees)  LUE AROM : WNL    Strength RUE  Strength RUE: WNL  Comment: Except Shoulder not tested due to fracture  Strength LUE  Strength LUE: WNL        Sensation  Overall Sensation Status: WNL             Assessment   Conditions Requiring Skilled Therapeutic Intervention  Body structures, Functions, Activity limitations: Decreased functional mobility ; Decreased ADL status; Decreased ROM; Decreased strength; Increased Pain  Assessment: PLOF- full use of R UE. CLOF- R shoulder fracture- very limited use of R shoulder  Treatment Diagnosis: Pain. Decreased R shoulder ROM and strength. Decreased R elbow ROM  Prognosis: Good  Decision Making: Medium Complexity  REQUIRES PT FOLLOW UP: Yes         Plan   Plan  Times per week: 2  Times per day: Daily  Plan weeks: 6  Current Treatment Recommendations: Strengthening, Manual Therapy - Joint Manipulation, Pain Management, ROM, Manual Therapy - Soft Tissue Mobilization, Home Exercise Program      Goals  Short term goals  Time Frame for Short term goals: 4 Weeks  Short term goal 1: Pt will show independence in HEP  Short term goal 2: Pt will show R shoulder PROM: Flexion 90, Abduction 90, ER 60, and IR 50  Short term goal 3: Pt will show full R elbow and wrist AROM  Long term goals  Time Frame for Long term goals : 8 Weeks  Long term goal 1: Pt will show R shoulder AROM: Flexion 140, Abduction 120, ER 85, and IR 55 so she can reach overhead. Long term goal 2: Pt will show R shoulder strength of grossly 4/5 so she can reach overhead for ADLs.   Patient Goals   Patient goals : \"normal mobility\"       Therapy Time   Individual Concurrent Group Co-treatment   Time In           Time Out           Minutes                   Liborio Culp, PT

## 2019-05-09 ENCOUNTER — HOSPITAL ENCOUNTER (OUTPATIENT)
Dept: PHYSICAL THERAPY | Age: 59
Setting detail: THERAPIES SERIES
Discharge: HOME | End: 2019-05-09
Payer: MEDICAID

## 2019-05-09 PROCEDURE — 97140 MANUAL THERAPY 1/> REGIONS: CPT

## 2019-05-09 PROCEDURE — 97110 THERAPEUTIC EXERCISES: CPT

## 2019-05-09 NOTE — FLOWSHEET NOTE
Physical Therapy Daily Treatment Note  Date:  2019    Patient Name:  Jonas Chamorro    :  1960  MRN: [id number]  Restrictions/Precautions:  R Humerus Fracture 3/31/19: PROM only, no surgery was done  Pertinent Medical History: OA  Medical/Treatment Diagnosis Information:  · Diagnosis: Fracture of R shoulder  · Treatment Diagnosis: Pain. Decreased R shoulder ROM and strength. Decreased R elbow ROM  Insurance/Certification information:  PT Insurance Information: [facility]  Physician Information:  Referring Practitioner: Dr. Sandhya Jerez of care signed (Y/N):    Visit# / total visits:    Pain level: 5/10     G-Code (if applicable):      Date / Visit # G-Code Applied:  /       Progress Note: []  Yes  []  No  Next due by: Visit #10      History of Injury:  Pt states she fell on 3/31/19 and broke her R shoulder. States she did not have to get surgery. Recently saw the MD and he wants her to wean out of the sling, only use it when in public. Pain 5/10. States sometimes it doesnt hurt. States her hand and elbow are stiff, has trouble straightening her R elbow. Subjective:     19: Pt states she is doing a little better.      Objective:  Observation:   Test measurements:      Exercises:  Exercise/Equipment Resistance/Repetitions Other comments   Table Slide Flexion x 20                                       HEP     Table Slide Flexion 19                         Other Therapeutic Activities:    19: Gave edema glove for R hand-educated to wear at night and at rest during day    Home Exercise Program:      Manual Treatments:    PROM R shoulder to tolerance- limited by pain  R elbow PROM in all directions  Light elbow distraction mobilization    Modalities:    CP to left shoulder x 10 minutes    Timed Code Treatment Minutes:  45    Total Treatment Minutes:  57    Assessment  [x] Patient tolerated treatment well [] Patient limited by fatigue  [x] Patient limited by pain  [] Patient limited by other medical complications  [] Other:         Prognosis: [x] Good [] Fair  [] Poor    Patient Requires Follow-up: [x] Yes  [] No    Plan:   [x] Continue per plan of care [] Alter current plan (see comments)  [] Plan of care initiated [] Hold pending MD visit [] Discharge    Plan for Next Session:   5/2/19: Per Dr. Belen Stoddard script: Work on R shoulder PROM, Elbow ROM-aggressive  Modalities: CP,estim for pain     Electronically signed by:  Lavell Torres PT

## 2019-05-10 ENCOUNTER — HOSPITAL ENCOUNTER (OUTPATIENT)
Dept: PHYSICAL THERAPY | Age: 59
Setting detail: THERAPIES SERIES
Discharge: HOME | End: 2019-05-10
Payer: MEDICAID

## 2019-05-10 PROCEDURE — 97140 MANUAL THERAPY 1/> REGIONS: CPT

## 2019-05-10 NOTE — FLOWSHEET NOTE
Physical Therapy Daily Treatment Note  Date:  5/10/2019    Patient Name:  Gato Joseph    :  1960  MRN: [id number]  Restrictions/Precautions:  R Humerus Fracture 3/31/19: PROM only, no surgery was done  Pertinent Medical History: OA  Medical/Treatment Diagnosis Information:  · Diagnosis: Fracture of R shoulder  · Treatment Diagnosis: Pain. Decreased R shoulder ROM and strength. Decreased R elbow ROM  Insurance/Certification information:  PT Insurance Information: [facility]  Physician Information:  Referring Practitioner: Dr. Lavelle Allison of care signed (Y/N):    Visit# / total visits:  3/12  Pain level: 5/10     G-Code (if applicable):      Date / Visit # G-Code Applied:  /       Progress Note: []  Yes  []  No  Next due by: Visit #10      History of Injury:  Pt states she fell on 3/31/19 and broke her R shoulder. States she did not have to get surgery. Recently saw the MD and he wants her to wean out of the sling, only use it when in public. Pain 5/10. States sometimes it doesnt hurt. States her hand and elbow are stiff, has trouble straightening her R elbow. Subjective:     19: Pt states she is doing a little better. 05/10/19: Patient reports shoulder soreness is not too bad ,but still a lot of swelling in her hand and elbow.     Objective:  Observation:   Test measurements:      Exercises:  Exercise/Equipment Resistance/Repetitions Other comments   Table Slide Flexion x 20                                       HEP     Table Slide Flexion 19                         Other Therapeutic Activities:    19: Gave edema glove for R hand-educated to wear at night and at rest during day    Home Exercise Program:      Manual Treatments:    PROM R shoulder to tolerance- limited by pain  R elbow PROM in all directions  Light elbow distraction mobilization    Modalities:    CP to left shoulder x 10 minutes    Timed Code Treatment Minutes:  35    Total Treatment Minutes:  45    Assessment  [x] Patient tolerated treatment well [] Patient limited by fatigue  [x] Patient limited by pain  [] Patient limited by other medical complications  [] Other:         Prognosis: [x] Good [] Fair  [] Poor    Patient Requires Follow-up: [x] Yes  [] No    Plan:   [x] Continue per plan of care [] Alter current plan (see comments)  [] Plan of care initiated [] Hold pending MD visit [] Discharge    Plan for Next Session:   5/2/19: Per Dr. Chavez Expose script: Work on R shoulder PROM, Elbow ROM-aggressive  Modalities: CP,estim for pain     Electronically signed by:  Moira Black PTA

## 2019-05-13 ENCOUNTER — OFFICE VISIT (OUTPATIENT)
Dept: ORTHOPEDIC SURGERY | Age: 59
End: 2019-05-13

## 2019-05-13 VITALS
WEIGHT: 251 LBS | HEIGHT: 63 IN | SYSTOLIC BLOOD PRESSURE: 146 MMHG | BODY MASS INDEX: 44.47 KG/M2 | HEART RATE: 90 BPM | DIASTOLIC BLOOD PRESSURE: 82 MMHG

## 2019-05-13 DIAGNOSIS — S42.91XD CLOSED FRACTURE OF RIGHT SHOULDER WITH ROUTINE HEALING, SUBSEQUENT ENCOUNTER: Primary | ICD-10-CM

## 2019-05-13 PROCEDURE — 99024 POSTOP FOLLOW-UP VISIT: CPT | Performed by: ORTHOPAEDIC SURGERY

## 2019-05-13 NOTE — PROGRESS NOTES
knee osteoarthritis    Plan: The patient will continue to work aggressively on range of motion and strengthening of the left wrist hand and elbow. She will also work aggressively on active and passive range of motion of the left shoulder. She will continue to modify her activities. The patient may begin to drive in 2 weeks. She will follow-up with me in 4 weeks and we will reassess her then.

## 2019-05-14 ENCOUNTER — HOSPITAL ENCOUNTER (OUTPATIENT)
Dept: PHYSICAL THERAPY | Age: 59
Setting detail: THERAPIES SERIES
Discharge: HOME | End: 2019-05-14
Payer: MEDICAID

## 2019-05-14 PROCEDURE — 97140 MANUAL THERAPY 1/> REGIONS: CPT

## 2019-05-14 PROCEDURE — 97110 THERAPEUTIC EXERCISES: CPT

## 2019-05-14 NOTE — FLOWSHEET NOTE
minutes    Timed Code Treatment Minutes:  40    Total Treatment Minutes:  50    Assessment  [x] Patient tolerated treatment well [] Patient limited by fatigue  [x] Patient limited by pain  [] Patient limited by other medical complications  [] Other:         Prognosis: [x] Good [] Fair  [] Poor    Patient Requires Follow-up: [x] Yes  [] No    Plan:   [x] Continue per plan of care [] Alter current plan (see comments)  [] Plan of care initiated [] Hold pending MD visit [] Discharge    Plan for Next Session:   5/2/19: Per Dr. Yue Amador script: Work on R shoulder PROM, Elbow ROM-aggressive  Modalities: CP,estim for pain     Electronically signed by:  Carlene Elam PTA

## 2019-05-15 ENCOUNTER — HOSPITAL ENCOUNTER (OUTPATIENT)
Dept: PHYSICAL THERAPY | Age: 59
Setting detail: THERAPIES SERIES
Discharge: HOME | End: 2019-05-15
Payer: MEDICAID

## 2019-05-15 PROCEDURE — 97140 MANUAL THERAPY 1/> REGIONS: CPT

## 2019-05-15 PROCEDURE — 97110 THERAPEUTIC EXERCISES: CPT

## 2019-05-15 NOTE — FLOWSHEET NOTE
Physical Therapy Daily Treatment Note  Date:  5/15/2019    Patient Name:  Nuria Jimenes    :  1960  MRN: [id number]  Restrictions/Precautions:  R Humerus Fracture 3/31/19: PROM only, no surgery was done  Pertinent Medical History: OA  Medical/Treatment Diagnosis Information:  · Diagnosis: Fracture of R shoulder  · Treatment Diagnosis: Pain. Decreased R shoulder ROM and strength. Decreased R elbow ROM  Insurance/Certification information:  PT Insurance Information: [facility]  Physician Information:  Referring Practitioner: Dr. Pauly Daniels of care signed (Y/N):    Visit# / total visits:    Pain level: 4/10     G-Code (if applicable):      Date / Visit # G-Code Applied:  /       Progress Note: []  Yes  []  No  Next due by: Visit #10      History of Injury:  Pt states she fell on 3/31/19 and broke her R shoulder. States she did not have to get surgery. Recently saw the MD and he wants her to wean out of the sling, only use it when in public. Pain 5/10. States sometimes it doesnt hurt. States her hand and elbow are stiff, has trouble straightening her R elbow. Subjective:     19: Pt states she is doing a little better. 05/10/19: Patient reports shoulder soreness is not too bad ,but still a lot of swelling in her hand and elbow. 19: Patient reports MD said shoulder is healing well.   5/15/19: Pt states she is doing better, still lots of swelling during the day    Objective:  Observation:   Test measurements:      Exercises:  Exercise/Equipment Resistance/Repetitions Other comments   Table Slide Flexion x 20  Scaption x 15    OH pulleys 3 min    Cane flex 15 x     Elbow flex 20                          HEP     Table Slide Flexion 19                         Other Therapeutic Activities:    19: Gave edema glove for R hand-educated to wear at night and at rest during day    Home Exercise Program:      Manual Treatments:    PROM R shoulder and light joint mobilizations to tolerance- limited by pain  R elbow PROM in all directions  Light elbow distraction mobilization    Modalities:    CP to left shoulder x 10 minutes    Timed Code Treatment Minutes:  40    Total Treatment Minutes:  60    Assessment  [x] Patient tolerated treatment well [] Patient limited by fatigue  [x] Patient limited by pain  [] Patient limited by other medical complications  [] Other:         Prognosis: [x] Good [] Fair  [] Poor    Patient Requires Follow-up: [x] Yes  [] No    Plan:   [x] Continue per plan of care [] Alter current plan (see comments)  [] Plan of care initiated [] Hold pending MD visit [] Discharge    Plan for Next Session:   5/2/19: Per Dr. Clayborn Cheadle script: Work on R shoulder PROM, Elbow ROM-aggressive  Modalities: CP,estim for pain     Electronically signed by:  April Topete PT

## 2019-05-20 ENCOUNTER — HOSPITAL ENCOUNTER (OUTPATIENT)
Dept: PHYSICAL THERAPY | Age: 59
Setting detail: THERAPIES SERIES
Discharge: HOME | End: 2019-05-20
Payer: MEDICAID

## 2019-05-20 PROCEDURE — 97140 MANUAL THERAPY 1/> REGIONS: CPT

## 2019-05-20 PROCEDURE — 97110 THERAPEUTIC EXERCISES: CPT

## 2019-05-20 NOTE — FLOWSHEET NOTE
Physical Therapy Daily Treatment Note  Date:  2019    Patient Name:  Dorie Gtz    :  1960  MRN: [id number]  Restrictions/Precautions:  R Humerus Fracture 3/31/19: PROM only, no surgery was done  Pertinent Medical History: OA  Medical/Treatment Diagnosis Information:  · Diagnosis: Fracture of R shoulder  · Treatment Diagnosis: Pain. Decreased R shoulder ROM and strength. Decreased R elbow ROM  Insurance/Certification information:  PT Insurance Information: [facility]  Physician Information:  Referring Practitioner: Dr. Maru Gutierrez of care signed (Y/N):    Visit# / total visits:    Pain level: 4/10     G-Code (if applicable):      Date / Visit # G-Code Applied:  /       Progress Note: []  Yes  []  No  Next due by: Visit #10      History of Injury:  Pt states she fell on 3/31/19 and broke her R shoulder. States she did not have to get surgery. Recently saw the MD and he wants her to wean out of the sling, only use it when in public. Pain 5/10. States sometimes it doesnt hurt. States her hand and elbow are stiff, has trouble straightening her R elbow. Subjective:     19: Pt states she is doing a little better. 05/10/19: Patient reports shoulder soreness is not too bad ,but still a lot of swelling in her hand and elbow. 19: Patient reports MD said shoulder is healing well. 5/15/19: Pt states she is doing better, still lots of swelling during the day   19: Patient reports she was able to drive for the first time since injury. States swelling is going down some.     Objective:  Observation:   Test measurements:      Exercises:  Exercise/Equipment Resistance/Repetitions Other comments   Table Slide Flexion x 20  Scaption x 15    OH pulleys 3 min    Cane flex 15 x     Elbow flex 20      Floor ranger 30 x                    HEP     Table Slide Flexion 19                         Other Therapeutic Activities:    19: Gave edema glove for R hand-educated to wear at night and at rest during day    Home Exercise Program:      Manual Treatments:    PROM R shoulder and light joint mobilizations to tolerance- limited by pain  R elbow PROM in all directions  Light elbow distraction mobilization    Modalities:    CP to left shoulder x 10 minutes    Timed Code Treatment Minutes:  40    Total Treatment Minutes:  60    Assessment  [x] Patient tolerated treatment well [] Patient limited by fatigue  [x] Patient limited by pain  [] Patient limited by other medical complications  [] Other:         Prognosis: [x] Good [] Fair  [] Poor    Patient Requires Follow-up: [x] Yes  [] No    Plan:   [x] Continue per plan of care [] Alter current plan (see comments)  [] Plan of care initiated [] Hold pending MD visit [] Discharge    Plan for Next Session:   5/2/19: Per Dr. Juan Luis Mraie script: Work on R shoulder PROM, Elbow ROM-aggressive  Modalities: CP,estim for pain     Electronically signed by:  Jennifer Gonzalez PTA

## 2019-05-22 ENCOUNTER — HOSPITAL ENCOUNTER (OUTPATIENT)
Dept: PHYSICAL THERAPY | Age: 59
Setting detail: THERAPIES SERIES
Discharge: HOME | End: 2019-05-22
Payer: MEDICAID

## 2019-05-22 PROCEDURE — 97140 MANUAL THERAPY 1/> REGIONS: CPT

## 2019-05-22 PROCEDURE — 97110 THERAPEUTIC EXERCISES: CPT

## 2019-05-28 ENCOUNTER — HOSPITAL ENCOUNTER (OUTPATIENT)
Dept: PHYSICAL THERAPY | Age: 59
Setting detail: THERAPIES SERIES
Discharge: HOME | End: 2019-05-28
Payer: MEDICAID

## 2019-05-28 PROCEDURE — 97140 MANUAL THERAPY 1/> REGIONS: CPT

## 2019-05-28 PROCEDURE — 97110 THERAPEUTIC EXERCISES: CPT

## 2019-05-28 NOTE — FLOWSHEET NOTE
Physical Therapy Daily Treatment Note  Date:  2019    Patient Name:  Michael Abbasi    :  1960  MRN: [id number]  Restrictions/Precautions:  R Humerus Fracture 3/31/19: PROM only, no surgery was done  Pertinent Medical History: OA  Medical/Treatment Diagnosis Information:  · Diagnosis: Fracture of R shoulder  · Treatment Diagnosis: Pain. Decreased R shoulder ROM and strength. Decreased R elbow ROM  Insurance/Certification information:  PT Insurance Information: [facility]  Physician Information:  Referring Practitioner: Dr. Sangita Steve of care signed (Y/N):    Visit# / total visits:    Pain level: 3/10     G-Code (if applicable):      Date / Visit # G-Code Applied:  /       Progress Note: []  Yes  []  No  Next due by: Visit #10      History of Injury:  Pt states she fell on 3/31/19 and broke her R shoulder. States she did not have to get surgery. Recently saw the MD and he wants her to wean out of the sling, only use it when in public. Pain 5/10. States sometimes it doesnt hurt. States her hand and elbow are stiff, has trouble straightening her R elbow. Subjective:     19: Pt states she is doing a little better. 05/10/19: Patient reports shoulder soreness is not too bad ,but still a lot of swelling in her hand and elbow. 19: Patient reports MD said shoulder is healing well. 5/15/19: Pt states she is doing better, still lots of swelling during the day   19: Patient reports she was able to drive for the first time since injury. States swelling is going down some. 19: Pt states she is doing better, little sore from last session  19: Pt states she is doing good. Still swelling in wrist and elbow.  Felt a sharp pain when she put weight through her hand after waking up    Objective:  Observation:   Test measurements:      Exercises:  Exercise/Equipment Resistance/Repetitions Other comments   Table Slide Flexion x 20  Scaption x 15    OH pulleys 3 min    Cane flex 15 x Elbow flex 20      Floor ranger                     HEP     Table Slide Flexion 5/2/19                         Other Therapeutic Activities:    5/2/19: Gave edema glove for R hand-educated to wear at night and at rest during day    Home Exercise Program:      Manual Treatments:    PROM R shoulder and light joint mobilizations to tolerance- limited by pain  R elbow PROM in all directions  Light elbow distraction mobilization    Modalities:    CP to left shoulder x 10 minutes    Timed Code Treatment Minutes:  29    Total Treatment Minutes:  53  Assessment  [x] Patient tolerated treatment well [] Patient limited by fatigue  [x] Patient limited by pain  [] Patient limited by other medical complications  [] Other:         Prognosis: [x] Good [] Fair  [] Poor    Patient Requires Follow-up: [x] Yes  [] No    Plan:   [x] Continue per plan of care [] Alter current plan (see comments)  [] Plan of care initiated [] Hold pending MD visit [] Discharge    Plan for Next Session:   5/2/19: Per Dr. Fernando Larson script: Work on R shoulder PROM, Elbow ROM-aggressive  Modalities: CP,estim for pain     Electronically signed by:  Bishop Spence PT

## 2019-05-29 ENCOUNTER — HOSPITAL ENCOUNTER (OUTPATIENT)
Dept: PHYSICAL THERAPY | Age: 59
Setting detail: THERAPIES SERIES
Discharge: HOME | End: 2019-05-29
Payer: MEDICAID

## 2019-05-29 PROCEDURE — 97140 MANUAL THERAPY 1/> REGIONS: CPT

## 2019-05-29 PROCEDURE — 97110 THERAPEUTIC EXERCISES: CPT

## 2019-05-29 NOTE — FLOWSHEET NOTE
Physical Therapy Daily Treatment Note  Date:  2019    Patient Name:  Alireza Judge    :  1960  MRN: [id number]  Restrictions/Precautions:  R Humerus Fracture 3/31/19: PROM only, no surgery was done  Pertinent Medical History: OA  Medical/Treatment Diagnosis Information:  · Diagnosis: Fracture of R shoulder  · Treatment Diagnosis: Pain. Decreased R shoulder ROM and strength. Decreased R elbow ROM  Insurance/Certification information:  PT Insurance Information: [facility]  Physician Information:  Referring Practitioner: Dr. Arlen Arteaga of care signed (Y/N):    Visit# / total visits:    Pain level: 3/10     G-Code (if applicable):      Date / Visit # G-Code Applied:  /       Progress Note: []  Yes  []  No  Next due by: Visit #10      History of Injury:  Pt states she fell on 3/31/19 and broke her R shoulder. States she did not have to get surgery. Recently saw the MD and he wants her to wean out of the sling, only use it when in public. Pain 5/10. States sometimes it doesnt hurt. States her hand and elbow are stiff, has trouble straightening her R elbow. Subjective:     19: Pt states she is doing a little better. 05/10/19: Patient reports shoulder soreness is not too bad ,but still a lot of swelling in her hand and elbow. 19: Patient reports MD said shoulder is healing well. 5/15/19: Pt states she is doing better, still lots of swelling during the day   19: Patient reports she was able to drive for the first time since injury. States swelling is going down some. 19: Pt states she is doing better, little sore from last session  19: Pt states she is doing good. Still swelling in wrist and elbow. Felt a sharp pain when she put weight through her hand after waking up  19: Patient reports swelling in wrist and elbow is about the same. States shoulder feels a little stiff today.     Objective:  Observation:   Test measurements:      Exercises:  Exercise/Equipment Resistance/Repetitions Other comments   Table Slide Flexion x 20  Scaption x 15    OH pulleys 3 min    Cane flex 15 x     Elbow flex 20      Floor ranger                     HEP     Table Slide Flexion 5/2/19                         Other Therapeutic Activities:    5/2/19: Gave edema glove for R hand-educated to wear at night and at rest during day    Home Exercise Program:      Manual Treatments:    PROM R shoulder and light joint mobilizations to tolerance- limited by pain  R elbow PROM in all directions  Light elbow distraction mobilization    Modalities:    CP to left shoulder x 10 minutes    Timed Code Treatment Minutes:  43    Total Treatment Minutes:  53  Assessment  [x] Patient tolerated treatment well [] Patient limited by fatigue  [x] Patient limited by pain  [] Patient limited by other medical complications  [] Other:         Prognosis: [x] Good [] Fair  [] Poor    Patient Requires Follow-up: [x] Yes  [] No    Plan:   [x] Continue per plan of care [] Alter current plan (see comments)  [] Plan of care initiated [] Hold pending MD visit [] Discharge    Plan for Next Session:   5/2/19: Per Dr. Joi Lamb script: Work on R shoulder PROM, Elbow ROM-aggressive  Modalities: CP,estim for pain     Electronically signed by:  Mary Darnell PTA

## 2019-05-30 ENCOUNTER — APPOINTMENT (OUTPATIENT)
Dept: PHYSICAL THERAPY | Age: 59
End: 2019-05-30
Payer: MEDICAID

## 2019-06-03 ENCOUNTER — APPOINTMENT (OUTPATIENT)
Dept: PHYSICAL THERAPY | Age: 59
End: 2019-06-03
Payer: MEDICAID

## 2019-06-04 ENCOUNTER — HOSPITAL ENCOUNTER (OUTPATIENT)
Dept: PHYSICAL THERAPY | Age: 59
Setting detail: THERAPIES SERIES
Discharge: HOME | End: 2019-06-04
Payer: MEDICAID

## 2019-06-05 ENCOUNTER — HOSPITAL ENCOUNTER (OUTPATIENT)
Dept: PHYSICAL THERAPY | Age: 59
Setting detail: THERAPIES SERIES
Discharge: HOME | End: 2019-06-05
Payer: MEDICAID

## 2019-06-05 PROCEDURE — 97140 MANUAL THERAPY 1/> REGIONS: CPT

## 2019-06-05 PROCEDURE — 97110 THERAPEUTIC EXERCISES: CPT

## 2019-06-05 NOTE — FLOWSHEET NOTE
Physical Therapy Daily Treatment Note  Date:  2019    Patient Name:  Keith Stoner    :  1960  MRN: [id number]  Restrictions/Precautions:  R Humerus Fracture 3/31/19: PROM only, no surgery was done  Pertinent Medical History: OA  Medical/Treatment Diagnosis Information:  · Diagnosis: Fracture of R shoulder  · Treatment Diagnosis: Pain. Decreased R shoulder ROM and strength. Decreased R elbow ROM  Insurance/Certification information:  PT Insurance Information: [facility]  Physician Information:  Referring Practitioner: Dr. Fariba Schmid of care signed (Y/N):    Visit# / total visits:  10/12  Pain level: 3/10     G-Code (if applicable):      Date / Visit # G-Code Applied:  /       Progress Note: []  Yes  []  No  Next due by: Visit #10      History of Injury:  Pt states she fell on 3/31/19 and broke her R shoulder. States she did not have to get surgery. Recently saw the MD and he wants her to wean out of the sling, only use it when in public. Pain 5/10. States sometimes it doesnt hurt. States her hand and elbow are stiff, has trouble straightening her R elbow. Subjective:     19: Pt states she is doing good. Still swelling in wrist and elbow. Felt a sharp pain when she put weight through her hand after waking up  19: Patient reports swelling in wrist and elbow is about the same. States shoulder feels a little stiff today. 19: Pt states she was not able to do her exercises the past two days due to family emergencies. Feels the shoulder is tightening up.     Objective:  Observation:   Test measurements:      Exercises:  Exercise/Equipment Resistance/Repetitions Other comments   Table Slide Flexion x 20  Scaption x 20    OH Pulleys 4 min    Cane Flex 15 x     Elbow Flex     Floor Jakin                     HEP     Table Slide Flexion 19                         Other Therapeutic Activities:    19: Gave edema glove for R hand-educated to wear at night and at rest during day    Home Exercise Program:      Manual Treatments:    PROM R shoulder and light joint mobilizations to tolerance- limited by pain      Modalities:    CP to left shoulder x 10 minutes    Timed Code Treatment Minutes:  40    Total Treatment Minutes:  58  Assessment  [x] Patient tolerated treatment well [] Patient limited by fatigue  [x] Patient limited by pain  [] Patient limited by other medical complications  [] Other:         Prognosis: [x] Good [] Fair  [] Poor    Patient Requires Follow-up: [x] Yes  [] No    Plan:   [x] Continue per plan of care [] Alter current plan (see comments)  [] Plan of care initiated [] Hold pending MD visit [] Discharge    Plan for Next Session:   5/2/19: Per Dr. Aden Lowe script: Work on R shoulder PROM, Elbow ROM-aggressive  Modalities: CP,estim for pain     Electronically signed by:  Neela Michael PT

## 2019-06-10 ENCOUNTER — HOSPITAL ENCOUNTER (OUTPATIENT)
Dept: PHYSICAL THERAPY | Age: 59
Setting detail: THERAPIES SERIES
Discharge: HOME | End: 2019-06-10
Payer: MEDICAID

## 2019-06-10 NOTE — PROGRESS NOTES
Physical Therapy  Cancellation/No-show Note  Patient Name:  Selma Parrish  :  1960   Date:  6/10/2019  Cancelled visits to date: 2  No-shows to date: 0    For today's appointment patient:  [x]  Cancelled  []  Rescheduled appointment  []  No-show     Reason given by patient:  []  Patient ill  []  Conflicting appointment  []  No transportation    []  Conflict with work  []  No reason given  [x]  Other:     Comments:      Electronically signed by:  Sarah justice PT

## 2019-06-12 ENCOUNTER — OFFICE VISIT (OUTPATIENT)
Dept: ORTHOPEDIC SURGERY | Age: 59
End: 2019-06-12

## 2019-06-12 ENCOUNTER — HOSPITAL ENCOUNTER (OUTPATIENT)
Dept: PHYSICAL THERAPY | Age: 59
Setting detail: THERAPIES SERIES
Discharge: HOME | End: 2019-06-12
Payer: MEDICAID

## 2019-06-12 VITALS
SYSTOLIC BLOOD PRESSURE: 144 MMHG | WEIGHT: 251 LBS | HEART RATE: 82 BPM | HEIGHT: 63 IN | BODY MASS INDEX: 44.47 KG/M2 | DIASTOLIC BLOOD PRESSURE: 83 MMHG

## 2019-06-12 DIAGNOSIS — S42.91XD CLOSED FRACTURE OF RIGHT SHOULDER WITH ROUTINE HEALING, SUBSEQUENT ENCOUNTER: Primary | ICD-10-CM

## 2019-06-12 PROCEDURE — 97110 THERAPEUTIC EXERCISES: CPT

## 2019-06-12 PROCEDURE — 99024 POSTOP FOLLOW-UP VISIT: CPT | Performed by: ORTHOPAEDIC SURGERY

## 2019-06-12 PROCEDURE — 97140 MANUAL THERAPY 1/> REGIONS: CPT

## 2019-06-12 NOTE — PROGRESS NOTES
Yunior Marino  [id number]  June 12, 2019    Chief Complaint   Patient presents with    1 Month Follow-Up     4 week follow up R shoulder           History: The patient is here in follow-up regarding her right shoulder. Her pain has significantly improved. She has been progressing well in therapy. She denies any numbness or tingling. The patient's  past medical history, medications, allergies,  family history, social history, and review of systems have been reviewed, and dated and are recorded in the chart. BP (!) 144/83   Pulse 82   Ht 5' 3\" (1.6 m)   Wt 251 lb (113.9 kg)   LMP 08/04/2015   BMI 44.46 kg/m²     Physical: Ms. Yunior Marino appears well, she is in no apparent distress, she demonstrates appropriate mood & affect. Examination of the right shoulder reveals mild tenderness at the fracture site. Range of motion of the right elbow is from 0 degrees to 150 degrees. Range of motion of the right shoulder is mildly painful. We abducted the right shoulder to 135°. We forward flexed the right shoulder to 140°. Examination of the skin reveals normal color,texture and turgor. There are no rashes or lesions. Right shoulder strength is 4/5. There is no evidence of gross joint instability bilaterally. Sensation is normal in the hands. Vascular examination reveals strong palpable pulses and brisk capillary refill. There is mild swelling in the affected wrist.  There is moderate right hand swelling. She does have moderate right hand stiffness  There is not clinical evidence of skeletal deformity or mal-alignment        X-Rays: 3 views of her right shoulder obtained in the office today were extensively reviewed. The greater tuberosity fracture is in good position. There has been no further displacement. The surgical neck component is well aligned. The fracture is healed.      Impression: right proximal humerus fracture     Plan: The patient will continue to work aggressively on range of motion and strengthening of the left wrist hand and elbow. She will also work aggressively on active and passive range of motion of the left shoulder. She will continue to modify her activities. She will work aggressively on strengthening of the right shoulder. She may begin to weight bear with the right upper extremity.

## 2019-06-12 NOTE — FLOWSHEET NOTE
Physical Therapy Daily Treatment Note  Date:  2019    Patient Name:  Alireza Judge    :  1960  MRN: [id number]  Restrictions/Precautions:  R Humerus Fracture 3/31/19: PROM only, no surgery was done  Pertinent Medical History: OA  Medical/Treatment Diagnosis Information:  · Diagnosis: Fracture of R shoulder  · Treatment Diagnosis: Pain. Decreased R shoulder ROM and strength. Decreased R elbow ROM  Insurance/Certification information:  PT Insurance Information: [facility]  Physician Information:  Referring Practitioner: Dr. Arlen Arteaga of care signed (Y/N):    Visit# / total visits:    Pain level: 3/10     G-Code (if applicable):      Date / Visit # G-Code Applied:  /       Progress Note: []  Yes  []  No  Next due by: Visit #10      History of Injury:  Pt states she fell on 3/31/19 and broke her R shoulder. States she did not have to get surgery. Recently saw the MD and he wants her to wean out of the sling, only use it when in public. Pain 5/10. States sometimes it doesnt hurt. States her hand and elbow are stiff, has trouble straightening her R elbow. Subjective:     19: Pt states she is doing good. Still swelling in wrist and elbow. Felt a sharp pain when she put weight through her hand after waking up  19: Patient reports swelling in wrist and elbow is about the same. States shoulder feels a little stiff today. 19: Pt states she was not able to do her exercises the past two days due to family emergencies. Feels the shoulder is tightening up.  19: Pt states she is doing better, has been doing her HEP everyday.     Objective:  Observation:   Test measurements:      Exercises:  Exercise/Equipment Resistance/Repetitions Other comments   Table Slide Flexion x 20  Scaption x 20    OH Pulleys 4 min    Cane Flex 15 x     Elbow Flex     Wall Slide X 30                   HEP     Table Slide Flexion 19                         Other Therapeutic Activities:    19: Gave edema glove for R hand-educated to wear at night and at rest during day    Home Exercise Program:      Manual Treatments:    PROM R shoulder and light joint mobilizations to tolerance- limited by pain      Modalities:    CP to left shoulder x 10 minutes    Timed Code Treatment Minutes:  40    Total Treatment Minutes:  62  Assessment  [x] Patient tolerated treatment well [] Patient limited by fatigue  [x] Patient limited by pain  [] Patient limited by other medical complications  [] Other:         Prognosis: [x] Good [] Fair  [] Poor    Patient Requires Follow-up: [x] Yes  [] No    Plan:   [x] Continue per plan of care [] Alter current plan (see comments)  [] Plan of care initiated [] Hold pending MD visit [] Discharge    Plan for Next Session:   5/2/19: Per Dr. Dorie Dyer script: Work on R shoulder PROM, Elbow ROM-aggressive  Modalities: CP,estim for pain     Electronically signed by:  Kaylyn Dance, PT

## 2019-06-19 ENCOUNTER — APPOINTMENT (OUTPATIENT)
Dept: PHYSICAL THERAPY | Age: 59
End: 2019-06-19
Payer: MEDICAID

## 2019-06-20 ENCOUNTER — HOSPITAL ENCOUNTER (OUTPATIENT)
Dept: PHYSICAL THERAPY | Age: 59
Setting detail: THERAPIES SERIES
Discharge: HOME | End: 2019-06-20
Payer: MEDICAID

## 2019-06-20 PROCEDURE — 97140 MANUAL THERAPY 1/> REGIONS: CPT

## 2019-06-20 PROCEDURE — 97110 THERAPEUTIC EXERCISES: CPT

## 2019-06-20 NOTE — FLOWSHEET NOTE
Physical Therapy Daily Treatment Note  Date:  2019    Patient Name:  Nuria Jimenes    :  1960  MRN: [id number]  Restrictions/Precautions:  R Humerus Fracture 3/31/19: PROM only, no surgery was done  Pertinent Medical History: OA  Medical/Treatment Diagnosis Information:  · Diagnosis: Fracture of R shoulder  · Treatment Diagnosis: Pain. Decreased R shoulder ROM and strength. Decreased R elbow ROM  Insurance/Certification information:  PT Insurance Information: [facility]  Physician Information:  Referring Practitioner: Dr. Pauly Daniels of care signed (Y/N):    Visit# / total visits:    Pain level: 3/10     G-Code (if applicable):      Date / Visit # G-Code Applied:  /       Progress Note: []  Yes  []  No  Next due by: Visit #10      History of Injury:  Pt states she fell on 3/31/19 and broke her R shoulder. States she did not have to get surgery. Recently saw the MD and he wants her to wean out of the sling, only use it when in public. Pain 5/10. States sometimes it doesnt hurt. States her hand and elbow are stiff, has trouble straightening her R elbow. Subjective:     19: Pt states she is doing good. Still swelling in wrist and elbow. Felt a sharp pain when she put weight through her hand after waking up  19: Patient reports swelling in wrist and elbow is about the same. States shoulder feels a little stiff today. 19: Pt states she was not able to do her exercises the past two days due to family emergencies. Feels the shoulder is tightening up.  19: Pt states she is doing better, has been doing her HEP everyday. 19: Saw the MD and her fracture is healed. He wants her to work on strengthening.     Objective:  Observation:   Test measurements:      Exercises:  Exercise/Equipment Resistance/Repetitions Other comments      OH Pulleys 4 min    Cane Flex 15 x     Elbow Flex     Wall Slide     Shoulder AROM FLexion 1# x 10 R    Tband ER  Red 2 x 20  IR  Green 2 x 20  Rows Shoulder AROM Supine Shoulder flexion   X 20 1#    HEP     Table Slide Flexion 5/2/19   Wall Slide     Elbow Curl     Tband ER/IR  2 x 20 Orange 6/20   Cane Flexion       Other Therapeutic Activities:    5/2/19: Gave edema glove for R hand-educated to wear at night and at rest during day    Home Exercise Program:      Manual Treatments:    PROM R shoulder and light joint mobilizations to tolerance- limited by pain      Modalities:    CP to left shoulder x 10 minutes    Timed Code Treatment Minutes:  40    Total Treatment Minutes:  58  Assessment  [x] Patient tolerated treatment well [] Patient limited by fatigue  [x] Patient limited by pain  [] Patient limited by other medical complications  [] Other:         Prognosis: [x] Good [] Fair  [] Poor    Patient Requires Follow-up: [x] Yes  [] No    Plan:   [x] Continue per plan of care [] Alter current plan (see comments)  [] Plan of care initiated [] Hold pending MD visit [] Discharge    Plan for Next Session:   6/12/19: Per Dr. Елена Bejarano: Work on aggressive ROM and Strengthening.  Fracture healed  5/2/19: Per Dr. Alex Watts script: Work on R shoulder PROM, Elbow ROM-aggressive  Modalities: CP,estim for pain     Electronically signed by:  Roseann Alva PT

## 2019-06-26 ENCOUNTER — APPOINTMENT (OUTPATIENT)
Dept: PHYSICAL THERAPY | Age: 59
End: 2019-06-26
Payer: MEDICAID

## 2019-07-02 ENCOUNTER — HOSPITAL ENCOUNTER (OUTPATIENT)
Dept: PHYSICAL THERAPY | Age: 59
Setting detail: THERAPIES SERIES
Discharge: HOME | End: 2019-07-02
Payer: MEDICAID

## 2019-07-02 NOTE — PROGRESS NOTES
Physical Therapy  Cancellation/No-show Note  Patient Name:  Rachna Bailey  :  1960   Date:  2019  Cancelled visits to date: 3  No-shows to date: 0    For today's appointment patient:  [x]  Cancelled  []  Rescheduled appointment  []  No-show     Reason given by patient:  []  Patient ill  []  Conflicting appointment  []  No transportation    []  Conflict with work  []  No reason given  [x]  Other:     Comments:   Mother is in the  hospital    Electronically signed by:  Arnoldo Lino PT

## 2019-07-03 ENCOUNTER — HOSPITAL ENCOUNTER (OUTPATIENT)
Dept: PHYSICAL THERAPY | Age: 59
Setting detail: THERAPIES SERIES
Discharge: HOME | End: 2019-07-03
Payer: MEDICAID

## 2019-07-03 PROCEDURE — 97140 MANUAL THERAPY 1/> REGIONS: CPT

## 2019-07-03 PROCEDURE — 97110 THERAPEUTIC EXERCISES: CPT

## 2019-07-16 ENCOUNTER — HOSPITAL ENCOUNTER (OUTPATIENT)
Dept: PHYSICAL THERAPY | Age: 59
Setting detail: THERAPIES SERIES
Discharge: HOME | End: 2019-07-16
Payer: MEDICAID

## 2019-07-17 ENCOUNTER — APPOINTMENT (OUTPATIENT)
Dept: PHYSICAL THERAPY | Age: 59
End: 2019-07-17
Payer: MEDICAID

## 2019-07-30 ENCOUNTER — CARE COORDINATION (OUTPATIENT)
Dept: CARE COORDINATION | Age: 59
End: 2019-07-30

## 2019-08-12 ENCOUNTER — CARE COORDINATION (OUTPATIENT)
Dept: CARE COORDINATION | Age: 59
End: 2019-08-12

## 2019-08-12 NOTE — CARE COORDINATION
Called pt to to determine if she scheduled appt with Dr Pipe Live. Pt states she has not scheduled but will. No need for additional CC intervention. Will close case.   Joaquin VELASQUEZ

## 2019-09-09 NOTE — PROGRESS NOTES
Outpatient Physical Therapy  [] [facility]    Phone: [phone]   Fax: [phone]   [x] [facility]  Phone: [phone]   Fax: [phone]  [] [facility]              Phone: [phone]   Fax: [phone]     Physical Therapy Discharge Note  Date: 2019        Patient Name:  Omar Desai    :  1960  MRN: [id number]  Restrictions/Precautions:  R Humerus Fracture 3/31/19: PROM only, no surgery was done  Pertinent Medical History: OA  Medical/Treatment Diagnosis Information:  · Diagnosis: Fracture of R shoulder  · Treatment Diagnosis: Pain. Decreased R shoulder ROM and strength. Decreased R elbow ROM  Insurance/Certification information:  PT Insurance Information: [facility]  Physician Information:  Referring Practitioner: Dr. Martina Evans of care signed (Y/N):    Visit# / total visits:    Pain level:      4-5/10       G-Code (if applicable):      Date G-Code Applied:          Time Period for Report:  4  Cancels/No-shows to date: 0       Plan of Care/Treatment to date:  [x] Therapeutic Exercise    [x] Modalities:  [x] Therapeutic Activity     [] Ultrasound  [] Electrical Stimulation  [] Gait Training      [] Cervical Traction    [] Lumbar Traction  [] Neuromuscular Re-education  [] Cold/hotpack [] Iontophoresis  [x] Instruction in HEP      Other:  [x] Manual Therapy       []    [] Aquatic Therapy       []                    ? Significant Findings At Last Visit/Comments:    Subjective:  7/3/19: Pt states she has not been doing anything the past two days. States her mom is in the hospital. Has not been stretching. States her shoulder is getting tight. Objective:  Observation:  Test measurements:       Assessment:  Summary: Pt made some progress with PT. Pt improved elbow and finger ROM. Swelling decreased significantly. Pt continued to have stiffness in R shoulder joint and limited PROM.  Pt was not able to come consistently or do HEP consistently last few weeks due to mother being in the hospital.  Patient's response to treatment: Fair      Current Frequency/Duration:  # Days per week: [] 1 day # Weeks: [] 1 week [] 4 weeks      [x] 2 days? [] 2 weeks [] 5 weeks      [] 3 days   [] 3 weeks [x] 6 weeks     Rehab Potential: [] Excellent [] Good [x] Fair  [] Poor     Goal Status:  [] Achieved [x] Partially Achieved  [] Not Achieved     Patient Status: [] Continue per initial plan of Care     [x] Patient now discharged     [] Additional visits requested, Please re-certify for additional visits:      Requested frequency/duration:  X/week for weeks    Electronically signed by:  Yash Stacy PT    If you have any questions or concerns, please don't hesitate to call.   Thank you for your referral.    Physician Signature:________________________________Date:__________________  By signing above, therapists plan is approved by physician

## 2019-12-08 ENCOUNTER — APPOINTMENT (OUTPATIENT)
Dept: CT IMAGING | Age: 59
End: 2019-12-08
Payer: MEDICAID

## 2019-12-08 ENCOUNTER — HOSPITAL ENCOUNTER (EMERGENCY)
Age: 59
Discharge: HOME | End: 2019-12-08
Payer: MEDICAID

## 2019-12-08 VITALS
RESPIRATION RATE: 18 BRPM | WEIGHT: 257.5 LBS | HEIGHT: 63 IN | HEART RATE: 88 BPM | SYSTOLIC BLOOD PRESSURE: 153 MMHG | DIASTOLIC BLOOD PRESSURE: 75 MMHG | BODY MASS INDEX: 45.62 KG/M2 | TEMPERATURE: 98.4 F | OXYGEN SATURATION: 99 %

## 2019-12-08 DIAGNOSIS — R10.31 ABDOMINAL PAIN, RIGHT LOWER QUADRANT: Primary | ICD-10-CM

## 2019-12-08 LAB
A/G RATIO: 1 (ref 1.1–2.2)
ALBUMIN SERPL-MCNC: 3.9 G/DL (ref 3.4–5)
ALP BLD-CCNC: 92 U/L (ref 40–129)
ALT SERPL-CCNC: 15 U/L (ref 10–40)
ANION GAP SERPL CALCULATED.3IONS-SCNC: 15 MMOL/L (ref 3–16)
AST SERPL-CCNC: 17 U/L (ref 15–37)
BASOPHILS ABSOLUTE: 0.1 K/UL (ref 0–0.2)
BASOPHILS RELATIVE PERCENT: 0.9 %
BILIRUB SERPL-MCNC: 0.3 MG/DL (ref 0–1)
BILIRUBIN URINE: NEGATIVE
BLOOD, URINE: NEGATIVE
BUN BLDV-MCNC: 18 MG/DL (ref 7–20)
CALCIUM SERPL-MCNC: 9.7 MG/DL (ref 8.3–10.6)
CHLORIDE BLD-SCNC: 103 MMOL/L (ref 99–110)
CLARITY: CLEAR
CO2: 23 MMOL/L (ref 21–32)
COLOR: YELLOW
CREAT SERPL-MCNC: 0.9 MG/DL (ref 0.6–1.2)
EOSINOPHILS ABSOLUTE: 0.2 K/UL (ref 0–0.6)
EOSINOPHILS RELATIVE PERCENT: 2.3 %
GFR AFRICAN AMERICAN: >60
GFR NON-AFRICAN AMERICAN: >60
GLOBULIN: 3.8 G/DL
GLUCOSE BLD-MCNC: 132 MG/DL (ref 70–99)
GLUCOSE URINE: NEGATIVE MG/DL
HCG(URINE) PREGNANCY TEST: NEGATIVE
HCT VFR BLD CALC: 35.7 % (ref 36–48)
HEMOGLOBIN: 11.5 G/DL (ref 12–16)
KETONES, URINE: NEGATIVE MG/DL
LEUKOCYTE ESTERASE, URINE: NEGATIVE
LYMPHOCYTES ABSOLUTE: 1.7 K/UL (ref 1–5.1)
LYMPHOCYTES RELATIVE PERCENT: 17.4 %
MCH RBC QN AUTO: 25.2 PG (ref 26–34)
MCHC RBC AUTO-ENTMCNC: 32.3 G/DL (ref 31–36)
MCV RBC AUTO: 78 FL (ref 80–100)
MICROSCOPIC EXAMINATION: NORMAL
MONOCYTES ABSOLUTE: 0.5 K/UL (ref 0–1.3)
MONOCYTES RELATIVE PERCENT: 5.1 %
NEUTROPHILS ABSOLUTE: 7.3 K/UL (ref 1.7–7.7)
NEUTROPHILS RELATIVE PERCENT: 74.3 %
NITRITE, URINE: NEGATIVE
PDW BLD-RTO: 16.4 % (ref 12.4–15.4)
PH UA: 5 (ref 5–8)
PLATELET # BLD: 277 K/UL (ref 135–450)
PMV BLD AUTO: 9.4 FL (ref 5–10.5)
POTASSIUM SERPL-SCNC: 3.9 MMOL/L (ref 3.5–5.1)
PROTEIN UA: NEGATIVE MG/DL
RBC # BLD: 4.57 M/UL (ref 4–5.2)
SODIUM BLD-SCNC: 141 MMOL/L (ref 136–145)
SPECIFIC GRAVITY UA: >=1.03 (ref 1–1.03)
TOTAL PROTEIN: 7.7 G/DL (ref 6.4–8.2)
URINE REFLEX TO CULTURE: NORMAL
URINE TYPE: NORMAL
UROBILINOGEN, URINE: 0.2 E.U./DL
WBC # BLD: 9.8 K/UL (ref 4–11)

## 2019-12-08 PROCEDURE — 81003 URINALYSIS AUTO W/O SCOPE: CPT

## 2019-12-08 PROCEDURE — 85025 COMPLETE CBC W/AUTO DIFF WBC: CPT

## 2019-12-08 PROCEDURE — 74177 CT ABD & PELVIS W/CONTRAST: CPT

## 2019-12-08 PROCEDURE — 6360000004 HC RX CONTRAST MEDICATION: Performed by: PHYSICIAN ASSISTANT

## 2019-12-08 PROCEDURE — 99284 EMERGENCY DEPT VISIT MOD MDM: CPT

## 2019-12-08 PROCEDURE — 36415 COLL VENOUS BLD VENIPUNCTURE: CPT

## 2019-12-08 PROCEDURE — 84703 CHORIONIC GONADOTROPIN ASSAY: CPT

## 2019-12-08 PROCEDURE — 80053 COMPREHEN METABOLIC PANEL: CPT

## 2019-12-08 RX ADMIN — IOVERSOL 100 ML: 678 INJECTION INTRA-ARTERIAL; INTRAVENOUS at 21:39

## 2019-12-08 ASSESSMENT — ENCOUNTER SYMPTOMS
BACK PAIN: 0
SORE THROAT: 0
FACIAL SWELLING: 0
CHOKING: 0
ABDOMINAL PAIN: 1
SHORTNESS OF BREATH: 0
EYE DISCHARGE: 0
EYE REDNESS: 0
VOMITING: 0
APNEA: 0
NAUSEA: 0

## 2019-12-08 ASSESSMENT — PAIN SCALES - GENERAL: PAINLEVEL_OUTOF10: 5

## 2019-12-08 ASSESSMENT — PAIN DESCRIPTION - ORIENTATION: ORIENTATION: RIGHT

## 2019-12-08 ASSESSMENT — PAIN DESCRIPTION - DESCRIPTORS: DESCRIPTORS: SHARP

## 2019-12-08 ASSESSMENT — PAIN DESCRIPTION - LOCATION: LOCATION: ABDOMEN

## 2020-06-22 NOTE — PROGRESS NOTES
[facility]  Office Visit    Leslie Wick  1960 June 24, 2020    CC:   Chief Complaint   Patient presents with    1 Year Follow Up     Palpitations, HTN    Palpitations     rarely     HPI:  The patient is 63 y.o. female with a past medical history significant for palpitations and HTN who is here for yearly follow up of her palpitations. She was last seen in February 2019 by Dr. Marysol Evans and it was recommended she use extra Diltiazem if she had breakthrough palpitations. Overall feeling well from cardiac standpoint. Has gained some weight gradually since last visit. No regular exercise. Planning to begin walking regimen later this week. 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 Has very infrequent palpitations (worse after increase caffeine intake)-monitors closely and seems to control. Has taken Diltiazem on 2 extra occasions since prescribed. Has KATALINA and uses CPAP nightly. Denies chest pain/discomfort, SOB, orthopnea/PND, cough, dizziness, syncope, edema or claudication. Review of Systems:  Constitutional: Denies  fatigue, weakness, night sweats or fever/chills   HEENT: Denies new visual changes, ringing in ears, nosebleeds,nasal congestion  Respiratory: Denies new or change in SOB, PND, orthopnea or cough. Cardiovascular: see HPI  GI: Denies N/V, diarrhea, constipation, abdominal pain, change in bowel habits, melena or hematochezia  : Denies urinary frequency, urgency, incontinence, hematuria or dysuria. Skin: Denies rash, hives, or cyanosis  Musculoskeletal: + arthritic pain in knees/shoulders  Neurological: Denies syncope or TIA-like symptoms.   Psychiatric: Denies anxiety, insomnia or depression     Past Medical History:   Diagnosis Date    Acid reflux     Arthritis     knees    Heart palpitations 2016    Osteoarthritis     Sleep apnea     Vitamin D deficiency      Past Surgical History:   Procedure Laterality Date    BREAST SURGERY      biopsy-lump or rub  Pulmonary/Chest: Effort normal.  Lungs clear to auscultation. Chest wall nontender  Abdominal: obese, soft, nontender, nondistended. + bowel sounds; no hepatomegaly   Extremities: No edema, cyanosis, or clubbing. Pulses are 2+ radial/DP/PT bilaterally. Cap refill brisk. Neurological: No focal deficit. Skin: Skin is warm and dry. Psychiatric: She has a normal mood and affect. Her speech is normal and behavior is normal.     Lab Review:   Lab Results   Component Value Date    TRIG 77 10/01/2019    HDL 47 10/01/2019    LDLCALC 72 10/01/2019    LABVLDL 15 10/01/2019     Lab Results   Component Value Date     12/08/2019    K 3.9 12/08/2019     12/08/2019    CO2 23 12/08/2019    BUN 18 12/08/2019    CREATININE 0.9 12/08/2019    GLUCOSE 132 12/08/2019    CALCIUM 9.7 12/08/2019      Lab Results   Component Value Date    WBC 9.8 12/08/2019    HGB 11.5 (L) 12/08/2019    HCT 35.7 (L) 12/08/2019    MCV 78.0 (L) 12/08/2019     12/08/2019     ECG 6/24/2020:  Sinus rhythm    10/2017 48 hour Holter Monitor:  Normal; SR    2/22/2016 GXT:   1. Duke Treadmill Score is 4 which indicates intermediate risk. 2.Negative ECG for ischemia with graded exercise test.    2/22/2016 Echo:  1. Normal LV size and systolic function, EF 11-13. 2. Normal RV size and systolic function; mildly elevated RVSP = 35-40  mmHg. 3. No significant valve disease. Assessment:    1. Heart palpitations  -overall fairly quiet  -prior Holter monitor demonstrated SR with PAC's  -continue Diltiazem    2. Essential hypertension  -continue medical management  -reinforced weight loss and regular exercise    3. Morbid obesity with body mass index (BMI) of 40.0 to 49.9 (HCC)  -weight loss encouraged    Plan:  Continue Diltiazem  Discussed low fat/low sodium diet and reinforced regular aerobic exercise.   Labs from 10/2019 and 12/2019 reviewed  Discussed weight loss and exercise  Advised to monitor BP at home and call if increases and

## 2020-06-24 ENCOUNTER — OFFICE VISIT (OUTPATIENT)
Dept: CARDIOLOGY CLINIC | Age: 60
End: 2020-06-24
Payer: MEDICAID

## 2020-06-24 VITALS
HEIGHT: 63 IN | BODY MASS INDEX: 46.42 KG/M2 | DIASTOLIC BLOOD PRESSURE: 84 MMHG | HEART RATE: 86 BPM | OXYGEN SATURATION: 98 % | SYSTOLIC BLOOD PRESSURE: 136 MMHG | TEMPERATURE: 98.9 F | WEIGHT: 262 LBS

## 2020-06-24 PROCEDURE — G8427 DOCREV CUR MEDS BY ELIG CLIN: HCPCS | Performed by: NURSE PRACTITIONER

## 2020-06-24 PROCEDURE — 93000 ELECTROCARDIOGRAM COMPLETE: CPT | Performed by: NURSE PRACTITIONER

## 2020-06-24 PROCEDURE — 1036F TOBACCO NON-USER: CPT | Performed by: NURSE PRACTITIONER

## 2020-06-24 PROCEDURE — 99213 OFFICE O/P EST LOW 20 MIN: CPT | Performed by: NURSE PRACTITIONER

## 2020-06-24 PROCEDURE — 3017F COLORECTAL CA SCREEN DOC REV: CPT | Performed by: NURSE PRACTITIONER

## 2020-06-24 PROCEDURE — G8417 CALC BMI ABV UP PARAM F/U: HCPCS | Performed by: NURSE PRACTITIONER

## 2020-08-11 ENCOUNTER — OFFICE VISIT (OUTPATIENT)
Dept: ORTHOPEDIC SURGERY | Age: 60
End: 2020-08-11
Payer: MEDICAID

## 2020-08-11 VITALS — TEMPERATURE: 97.1 F | HEIGHT: 63 IN | WEIGHT: 262 LBS | BODY MASS INDEX: 46.42 KG/M2

## 2020-08-11 PROCEDURE — G8428 CUR MEDS NOT DOCUMENT: HCPCS | Performed by: ORTHOPAEDIC SURGERY

## 2020-08-11 PROCEDURE — G8417 CALC BMI ABV UP PARAM F/U: HCPCS | Performed by: ORTHOPAEDIC SURGERY

## 2020-08-11 PROCEDURE — 99214 OFFICE O/P EST MOD 30 MIN: CPT | Performed by: ORTHOPAEDIC SURGERY

## 2020-08-11 PROCEDURE — 3017F COLORECTAL CA SCREEN DOC REV: CPT | Performed by: ORTHOPAEDIC SURGERY

## 2020-08-11 PROCEDURE — 1036F TOBACCO NON-USER: CPT | Performed by: ORTHOPAEDIC SURGERY

## 2020-08-11 PROCEDURE — 20610 DRAIN/INJ JOINT/BURSA W/O US: CPT | Performed by: ORTHOPAEDIC SURGERY

## 2020-08-11 RX ORDER — METHYLPREDNISOLONE ACETATE 40 MG/ML
80 INJECTION, SUSPENSION INTRA-ARTICULAR; INTRALESIONAL; INTRAMUSCULAR; SOFT TISSUE ONCE
Status: COMPLETED | OUTPATIENT
Start: 2020-08-11 | End: 2020-08-11

## 2020-08-11 RX ORDER — BUPIVACAINE HYDROCHLORIDE 2.5 MG/ML
3 INJECTION, SOLUTION INFILTRATION; PERINEURAL ONCE
Status: COMPLETED | OUTPATIENT
Start: 2020-08-11 | End: 2020-08-11

## 2020-08-11 RX ADMIN — BUPIVACAINE HYDROCHLORIDE 7.5 MG: 2.5 INJECTION, SOLUTION INFILTRATION; PERINEURAL at 15:17

## 2020-08-11 RX ADMIN — METHYLPREDNISOLONE ACETATE 80 MG: 40 INJECTION, SUSPENSION INTRA-ARTICULAR; INTRALESIONAL; INTRAMUSCULAR; SOFT TISSUE at 15:18

## 2020-08-11 NOTE — PROGRESS NOTES
Maged Santiago  <[id number]>  August 11, 2020    Chief Complaint   Patient presents with    Knee Pain     Left        History: The patient is a 59-year-old female who is here for evaluation of her left knee. She has had left knee pain for approximately 3 weeks. The patient has difficulty walking for long period of time. She does have difficulty with stairs. She occasionally has pain at nighttime. She does have significant gastritis and cannot take anti-inflammatories. She generally takes Tylenol for the pain. She denies any numbness or tingling. She denies any specific injury. The patient's  past medical history, medications, allergies,  family history, social history, and have been reviewed, and dated and are recorded in the chart. Pertinent items are noted in HPI. Review of systems reviewed from Pertinent History Form dated on 8/11/20 and available in the patient's chart under the Media tab. Vitals:  Ht 5' 3\" (1.6 m)   Wt 262 lb (118.8 kg)   LMP 08/04/2015   BMI 46.41 kg/m²     Physical: Ms. Maged Santiago appears well, she is in no apparent distress, she demonstrates appropriate mood & affect. She is alert and oriented to person, place and time. Examination of the left lower extremity reveals no pain with range of motion of the hip. She has generalized tenderness to palpation about the joint line of the left knee. Range of motion is from -2 degrees to 115 degrees. Strength is 4+ to 5/5 for all muscle groups about the left knee. There is patellofemoral crepitus with range of motion of the left knee. Varus and valgus stressing of the knees reveals no evidence of instability. There is a small effusion in the left knee. Anterior drawer and Lachman are negative bilaterally. Examination of the skin reveals no rashes, ulceration, or lesion, bilaterally in the lower extremities. Sensation to both lower extremities is grossly intact. Exam of both feet reveals pedal pulses intact and brisk cap refill. Patient is able to dorsiflex and wiggle all toes. Deep tendon reflexes of the lower extremities are normal and symmetric. X-rays: 4 views of the left knee obtained in the office today were extensively reviewed. There is mild medial joint space narrowing. There is moderate patellofemoral arthritis. Impression: Left knee osteoarthritis    Plan: At this time, the left knee was injected under sterile conditions. After a Betadine prep of the joint, 3 cc of 0.25% Marcaine and 2 cc of 40 mg Depo-medrol were injected into the knee. The patient tolerated the injection rather well. The patient was instructed to follow up for any signs of infection. Natural history and expected course discussed. Questions answered. Reduction in offending activity. OTC analgesics as needed. The patient will follow up with me in 6 weeks.   She was instructed on a weight loss program.

## 2020-10-13 PROBLEM — J06.9 VIRAL UPPER RESPIRATORY TRACT INFECTION: Status: RESOLVED | Noted: 2018-12-17 | Resolved: 2020-10-13

## 2020-10-13 PROBLEM — R63.5 WEIGHT GAIN: Status: RESOLVED | Noted: 2018-09-26 | Resolved: 2020-10-13

## 2020-12-15 ENCOUNTER — OFFICE VISIT (OUTPATIENT)
Dept: PRIMARY CARE CLINIC | Age: 60
End: 2020-12-15
Payer: MEDICAID

## 2020-12-15 PROCEDURE — G8417 CALC BMI ABV UP PARAM F/U: HCPCS | Performed by: NURSE PRACTITIONER

## 2020-12-15 PROCEDURE — G8428 CUR MEDS NOT DOCUMENT: HCPCS | Performed by: NURSE PRACTITIONER

## 2020-12-15 PROCEDURE — 99211 OFF/OP EST MAY X REQ PHY/QHP: CPT | Performed by: NURSE PRACTITIONER

## 2020-12-18 LAB — SARS-COV-2, NAA: DETECTED

## 2021-01-05 ENCOUNTER — OFFICE VISIT (OUTPATIENT)
Dept: SLEEP MEDICINE | Age: 61
End: 2021-01-05
Payer: MEDICAID

## 2021-01-05 VITALS
DIASTOLIC BLOOD PRESSURE: 78 MMHG | OXYGEN SATURATION: 99 % | HEIGHT: 63 IN | HEART RATE: 92 BPM | TEMPERATURE: 97.5 F | WEIGHT: 263.8 LBS | SYSTOLIC BLOOD PRESSURE: 159 MMHG | BODY MASS INDEX: 46.74 KG/M2 | RESPIRATION RATE: 16 BRPM

## 2021-01-05 DIAGNOSIS — G47.33 OSA ON CPAP: Primary | ICD-10-CM

## 2021-01-05 DIAGNOSIS — Z99.89 DEPENDENCE ON OTHER ENABLING MACHINES AND DEVICES: ICD-10-CM

## 2021-01-05 DIAGNOSIS — Z99.89 OSA ON CPAP: Primary | ICD-10-CM

## 2021-01-05 PROCEDURE — 3017F COLORECTAL CA SCREEN DOC REV: CPT | Performed by: PSYCHIATRY & NEUROLOGY

## 2021-01-05 PROCEDURE — G8417 CALC BMI ABV UP PARAM F/U: HCPCS | Performed by: PSYCHIATRY & NEUROLOGY

## 2021-01-05 PROCEDURE — G8427 DOCREV CUR MEDS BY ELIG CLIN: HCPCS | Performed by: PSYCHIATRY & NEUROLOGY

## 2021-01-05 PROCEDURE — 1036F TOBACCO NON-USER: CPT | Performed by: PSYCHIATRY & NEUROLOGY

## 2021-01-05 PROCEDURE — 99213 OFFICE O/P EST LOW 20 MIN: CPT | Performed by: PSYCHIATRY & NEUROLOGY

## 2021-01-05 PROCEDURE — G8482 FLU IMMUNIZE ORDER/ADMIN: HCPCS | Performed by: PSYCHIATRY & NEUROLOGY

## 2021-01-05 ASSESSMENT — SLEEP AND FATIGUE QUESTIONNAIRES
HOW LIKELY ARE YOU TO NOD OFF OR FALL ASLEEP WHILE SITTING QUIETLY AFTER LUNCH WITHOUT ALCOHOL: 0
HOW LIKELY ARE YOU TO NOD OFF OR FALL ASLEEP WHILE SITTING AND READING: 0
HOW LIKELY ARE YOU TO NOD OFF OR FALL ASLEEP WHILE LYING DOWN TO REST IN THE AFTERNOON WHEN CIRCUMSTANCES PERMIT: 0
HOW LIKELY ARE YOU TO NOD OFF OR FALL ASLEEP WHILE WATCHING TV: 0
HOW LIKELY ARE YOU TO NOD OFF OR FALL ASLEEP IN A CAR, WHILE STOPPED FOR A FEW MINUTES IN TRAFFIC: 0

## 2021-01-05 NOTE — PATIENT INSTRUCTIONS
Patient Education        Learning About CPAP for Sleep Apnea  What is CPAP? CPAP is a small machine that you use at home every night while you sleep. It increases air pressure in your throat to keep your airway open. When you have sleep apnea, this can help you sleep better so you feel much better. CPAP stands for \"continuous positive airway pressure. \"  The CPAP machine will have one of the following:  · A mask that covers your nose and mouth  · Prongs that fit into your nose  · A mask that covers your nose only, which is the most common type. This type is called NCPAP. The N stands for \"nasal.\"  Why is it done? CPAP is usually the best treatment for obstructive sleep apnea. It is the first treatment choice and the most widely used. CPAP:  · Helps you have more normal sleep, so you feel less sleepy and more alert during the daytime. · May help keep heart failure or other heart problems from getting worse. · May help lower your blood pressure. If you use CPAP, your bed partner may also sleep better. That's because you aren't snoring or restless. Your doctor may suggest CPAP if you have:  · Moderate to severe sleep apnea. · Sleep apnea and coronary artery disease (CAD). · Sleep apnea and heart failure. What are the side effects? Some people who use CPAP have:  · A dry or stuffy nose and a sore throat. · Irritated skin on the face. · Sore eyes. · Bloating. How can you care for yourself? If using CPAP is not comfortable, or if you have certain side effects, work with your doctor to fix them. Here are some things you can try:  · Be sure the mask or nasal prongs fit well. · See if your doctor can adjust the pressure of your CPAP. · If your nose is dry, try a humidifier. · If your nose is runny or stuffy, try decongestant medicine or a steroid nasal spray. Be safe with medicines. Read and follow all instructions on the label. Do not use the medicine longer than the label says. If these things don't help, you might try a different type of machine. Some machines have air pressure that adjusts on its own. Others have air pressures that are different when you breathe in than when you breathe out. This may reduce discomfort caused by too much pressure in your nose. Where can you learn more? Go to [email or web address]. org and sign in to your [employer] account. Enter K409 in the Pazien box to learn more about \"Learning About CPAP for Sleep Apnea. \"     If you do not have an account, please click on the \"Sign Up Now\" link. Current as of: February 24, 2020               Content Version: 12.6  © 2006-2020 [employer]. Care instructions adapted under license by [facility] ([facility]). If you have questions about a medical condition or this instruction, always ask your healthcare professional. [address] any warranty or liability for your use of this information.

## 2021-01-05 NOTE — PROGRESS NOTES
MD TOM Kennedy Board Certified in Sleep Medicine  Certified in [address] [place] [address] Certified in Neurology [address]  [address] [id number] [address] [place] [address],  Mian Placido   [phone]   [address] [place] [address], [address] [facility]                      [address]  [address] [place] [id number] [phone]    Subjective:     Patient ID: Clare Hernandez is a 63 y.o. female. Chief Complaint   Patient presents with    New Patient     last seen 2017 for christina        HPI:        Clare Hernandez is a 63 y.o. female was seen today as a follow for obstructive sleep apnea. The patient underwent comprehensive polysomnogram on 04/19/2017, the overnight registration revealed moderate obstructive sleep apnea with apnea hypopnea index of 24.4/hr with lowest O2 saturation of 78%, patient spent about 18.3 minutes below 90%. Subsequently, the patient underwent successful PAP titration on 05/16/2017, the lowest O2 saturation while on PAP was 94%. Patient is using the PAP machine about 100% of the time, more than 4 hours a nightabout  95 %, in total average of 7:35 hours a night in last 60 days. Currently on PAP at 10 cm (), the AHI is only 6.2 events per hour at this pressure. Patient improved regarding daytime sleepiness and fatigue, wakes up refreshed in the morning. The Patient scored Total score: 2 on [place] Sleepiness Scale ( more than 10 is indicative of daytime sleepiness)   Patient has no problem with PAP pressure or mask. Lost the follow up for 3 years.   Has gained 50 pounds since 03/2017  DOT/CDL - N/A        Previous Report(s)Reviewed: historical medical records         Social History     Socioeconomic History    Marital status: Single     Spouse name: Not on file    Number of children: Not on file    Years of education: Not on file  Highest education level: Not on file   Occupational History    Occupation: [employer]     Comment: [employer] Occupation:    Social Needs    Financial resource strain: Not on file    Food insecurity     Worry: Not on file     Inability: Not on file   [employer] needs     Medical: Not on file     Non-medical: Not on file   Tobacco Use    Smoking status: Never Smoker    Smokeless tobacco: Never Used   Substance and Sexual Activity    Alcohol use: No    Drug use: No    Sexual activity: Not Currently   Lifestyle    Physical activity     Days per week: Not on file     Minutes per session: Not on file    Stress: Not on file   Relationships    Social connections     Talks on phone: Not on file     Gets together: Not on file     Attends Gnosticist service: Not on file     Active member of club or organization: Not on file     Attends meetings of clubs or organizations: Not on file     Relationship status: Not on file    Intimate partner violence     Fear of current or ex partner: Not on file     Emotionally abused: Not on file     Physically abused: Not on file     Forced sexual activity: Not on file   Other Topics Concern    Not on file   Social History Narrative    Caffeine:        SODA - 3 cans a day          TEA - 0        COFFEE - 0       Prior to Admission medications    Medication Sig Start Date End Date Taking?  Authorizing Provider   Cholecalciferol (MAXIMUM D3) 325 MCG (44864 UT) CAPS Take 13,000 Units by mouth once a week Lot # [id number]  Qty: 2 Packs 11/13/20  Yes Shalonda Shankar MD   Cholecalciferol (MAXIMUM D3) 325 MCG (23101 UT) CAPS Take 13,000 Units by mouth twice a week Lot # [id number]  [id number] [id number]   Qty: 2 Boxes 10/19/20  Yes Shalonda Shankar MD   Cholecalciferol (VITAMIN D) 50 MCG (2000 UT) CAPS capsule Take 2,000 Units by mouth   Yes Historical Provider, MD dilTIAZem (CARDIZEM CD) 240 MG extended release capsule Take 1 capsule by mouth daily 10/13/20  Yes Amelia Dao MD   omeprazole (PRILOSEC) 20 MG delayed release capsule TAKE 2 CAPSULES BY MOUTH DAILY 7/14/20  Yes Amelia Dao MD   zinc oxide 13 % CREA Apply topically 2 times daily as needed for Rash Prn yeast infections   Yes Historical Provider, MD   acetaminophen (TYLENOL) 500 MG tablet Take 1,000 mg by mouth every 6 hours as needed for Pain    Yes Historical Provider, MD   Probiotic Product (PROBIOTIC-10) CAPS Take 1 tablet by mouth daily   Yes Historical Provider, MD       Allergies as of 01/05/2021 - Review Complete 01/05/2021   Allergen Reaction Noted    Sulfa antibiotics  08/18/2015       Patient Active Problem List   Diagnosis    Heart palpitations    Essential hypertension    Chronic right-sided low back pain without sciatica    Vitamin D deficiency    Hyperglycemia       Past Medical History:   Diagnosis Date    Acid reflux     Arthritis     knees    Heart palpitations 2016    Osteoarthritis     Sleep apnea     Vitamin D deficiency        Past Surgical History:   Procedure Laterality Date    BREAST SURGERY      biopsy-lump removed    DILATION AND CURETTAGE OF UTERUS      PARTIAL HYSTERECTOMY Left     ovarian tumor     TUBAL LIGATION         Family History   Problem Relation Age of Onset    Cancer Father     High Blood Pressure Father     High Cholesterol Father     Cancer Maternal Grandmother     Arthritis Mother     High Blood Pressure Mother        Review of Systems    Objective:     Vitals:  Weight BMI Neck circumference    Wt Readings from Last 3 Encounters:   01/05/21 263 lb 12.8 oz (119.7 kg)   10/13/20 273 lb (123.8 kg)   08/11/20 262 lb (118.8 kg)    Body mass index is 46.73 kg/m².  Neck circumference: 21.5     BP HR SaO2   BP Readings from Last 3 Encounters:   01/05/21 (!) 159/78   10/13/20 138/84   06/24/20 136/84    Pulse Readings from Last 3 Encounters: 01/05/21 92   10/13/20 92   06/24/20 86    SpO2 Readings from Last 3 Encounters:   01/05/21 99%   10/13/20 98%   06/24/20 98%        Themandibular molar Class :   [x]1 []2 []3      Mallampati I Airway Classification:   []1 []2 []3 [x]4      Physical Exam  Vitals signs and nursing note reviewed. Constitutional:       Appearance: Normal appearance. HENT:      Head: Atraumatic. Nose: Nose normal.      Mouth/Throat:      Mouth: Mucous membranes are moist.   Eyes:      Extraocular Movements: Extraocular movements intact. Neck:      Musculoskeletal: Normal range of motion and neck supple. Cardiovascular:      Rate and Rhythm: Normal rate and regular rhythm. Heart sounds: Normal heart sounds. Musculoskeletal: Normal range of motion. Skin:     General: Skin is warm. Neurological:      General: No focal deficit present. Psychiatric:         Mood and Affect: Mood normal.         :   Moderate Obstructive Sleep Apnea/Hypopnea Syndrome under good control on PAP at 10 cmwp. Diagnosis Orders   1. KATALINA on CPAP     2. Dependence on other enabling machines and devices       Plan:       I adjusted the PAP pressure to the PAP pressure of 10 and 14  cmwp. I will order PAP supplies, mask, filters. ... We discussed the proportionality between weight and AHI. With 10% weight change, the AHI has a 27% proportionate change. With 20% weight change, the AHI has a 45-50% proportionate change. No orders of the defined types were placed in this encounter. Return in about 1 year (around 1/5/2022) for Reveiwing CPAP usage and compliance report and tro.     Chano Egan MD  Medical Director [address] [place] [address]

## 2021-01-05 NOTE — PROGRESS NOTES
Luis Laird         : 1960        PHONE: [phone] (home)   [facility]  Diagnosis: [x] KATALINA (G47.33) [] CSA (G47.31) [] Apnea (G47.30)   Length of Need: [] 12 Months [x] 99 Months [] Other:    Machine (YOJANA!): [] Respironics Dream Station      Auto [] ResMed AirSense     Auto [] Other:     []  CPAP () [] Bilevel ()   Mode: [] Auto [] Spontaneous    Mode: [] Auto [] Spontaneous           Please change the CPAP to APAP between 10 and 14 cm                      Humidifier: [] Heated ()        [x] Water chamber replacement ()/ 1 per 6 months        Mask:   [x] Nasal () /1 per 3 months [] Full Face () /1 per 3 months   [x] Patient choice -Size and fit mask [] Patient Choice - Size and fit mask   [] Dispense:  [] Dispense:    [x] Headgear () / 1 per 3 months [] Headgear () / 1 per 3 months   [x] Replacement Nasal Cushion ()/2 per month [] Interface Replacement ()/1 per month   [x] Replacement Nasal Pillows ()/2 per month         Tubing: [x] Heated ()/1 per 3 months    [] Standard ()/1 per 3 months [] Other:           Filters: [x] Non-disposable ()/1 per 6 months     [x] Ultra-Fine, Disposable ()/2 per month        Miscellaneous: [] Chin Strap ()/ 1 per 6 months [] O2 bleed-in:       LPM   [] Oximetry on CPAP/Bilevel []  Other:          Start Order Date: 21    MEDICAL JUSTIFICATION:  I, the undersigned, certify that the above prescribed supplies are medically necessary for this patients wellbeing. In my opinion, the supplies are both reasonable and necessary in reference to accepted standards of medicalpractice in treatment of this patients condition.     Serenity Altamirano MD      NPI: [id number]       Order Signed Date: 01/05/21    Electronically signed by Serenity Altamirano MD on 2021 at 12:16 PM

## 2021-01-29 ENCOUNTER — TELEPHONE (OUTPATIENT)
Dept: PULMONOLOGY | Age: 61
End: 2021-01-29

## 2021-01-29 NOTE — TELEPHONE ENCOUNTER
Patient called because she has still not received her cpap supplies from . I contacted India Norwood at  and was informed that the patient has been non-compliant since 2017. She has been called many times and has not answered. She has been sent repossession letters due to her machine not being paid. Patient will have to return her machine to  and then change DME's to [facility]. In order to get a new cpap, she will have to have another sleep study. I informed patient of what I was told and she denied being non-compliant and denied receiving letters. She is going to call  to get the information directly from them. 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

## 2021-06-08 NOTE — PROGRESS NOTES
[facility]    Daisha Gordon  1960 June 11, 2021    CC: \"I am stressed out\"     HPI: The patient is 57 y.o. female with a past medical history significant for palpitations (years)-echo and stress normal and was managed per Dr. Stan Lora. She presents today to follow up for her palpitations. Today, she reports feeling \"okay\". She admits to a lot of personal stress caring for her mother. She admits to an episode a month ago of palpitations. She took an extra dose of Cardizem and the symptoms resolved. She has been walking her new dog and as a result has lost 20 pounds. She denies any exertional symptoms. Patient denies exertional chest pain/pressure, dyspnea at rest, FRANKLIN, PND, orthopnea, palpitations, lightheadedness, weight changes, changes in LE edema, and syncope. Review of Systems:  Constitutional: No fatigue, weakness, night sweats or fever. HEENT: No new vision difficulties or ringing in the ears. Respiratory: No new SOB, PND, orthopnea or cough. Cardiovascular: See HPI   GI: No n/v, diarrhea, constipation, abdominal pain or changes in bowel habits. No melena, no hematochezia  : No urinary frequency, urgency, incontinence, hematuria or dysuria. Skin: No cyanosis or skin lesions. Musculoskeletal: No new muscle or joint pain. Neurological: No syncope or TIA-like symptoms.   Psychiatric: No anxiety, insomnia or depression      Allergies   Allergen Reactions    Sulfa Antibiotics      Current Outpatient Medications   Medication Sig Dispense Refill    Probiotic Product (PROBIOTIC PO) Take by mouth daily      omeprazole (PRILOSEC) 20 MG delayed release capsule TAKE 2 CAPSULES BY MOUTH DAILY 60 capsule 5    dilTIAZem (CARDIZEM CD) 240 MG extended release capsule TAKE 1 CAPSULE BY MOUTH DAILY 30 capsule 5    Cholecalciferol (MAXIMUM D3) 325 MCG (59007 UT) CAPS Take 13,000 Units by mouth twice a week Lot # [id number]   Qty: 2 Boxes 2 capsule 0  zinc oxide 13 % CREA Apply topically 2 times daily as needed for Rash Prn yeast infections      acetaminophen (TYLENOL) 500 MG tablet Take 1,000 mg by mouth every 6 hours as needed for Pain       Probiotic Product (PROBIOTIC-10) CAPS Take 1 tablet by mouth daily (Patient not taking: Reported on 6/11/2021)       No current facility-administered medications for this visit. Physical Exam:   Vitals:    06/11/21 0915   BP: 136/82   Pulse: 80   SpO2: 98%     Wt Readings from Last 2 Encounters:   06/11/21 253 lb (114.8 kg)   01/05/21 263 lb 12.8 oz (119.7 kg)     Constitutional: She is oriented to person, place, and time. She appears well-developed and well-nourished. In no acute distress. Head: Normocephalic and atraumatic. Neck: Neck supple. No JVD present. Carotid bruit is not present. No mass and no thyromegaly present. No lymphadenopathy present. Cardiovascular: Normal rate, regular rhythm, normal heart sounds and intact distal pulses. Exam reveals no gallop and no friction rub. No murmur heard. Pulmonary/Chest: Effort normal and breath sounds normal. No respiratory distress. She has no wheezes, rhonchi or rales. Abdominal: Soft, non-tender. Bowel sounds and aorta are normal. She exhibits no organomegaly, mass or bruit. Extremities: No edema, cyanosis, or clubbing. Pulses are 2+ radial/carotid/dorsalis pedis and posterior tibial bilaterally. Neurological: She is alert and oriented to person, place, and time. She has normal reflexes. No cranial nerve deficit. Coordination normal.   Skin: Skin is warm and dry. There is no rash or diaphoresis. Psychiatric: She has a normal mood and affect. Her speech is normal and behavior is normal.     EKG Interpretation 2/28/19: Sinus rhythm, QTc 426ms  EKG Interpretation 6/11/21: Sinus rhythm with normal intervals and axis      Imaging:     Echo 2/22/16  Left Ventricle: Overall left ventricular ejection fraction is  estimated to be 55-60%.  The left ventricle is normal in size. There  is normal left ventricular wall thickness. The left ventricular wall  motion is normal. No left ventricular thrombus detected. Right Ventricle: The right ventricle is normal size. There is normal  right ventricular wall thickness. The right ventricular systolic  function is normal.  Left Atrium: The left atrial size is normal.  Right Atrium: Right atrial size is normal.  Mitral Valve: The mitral valve leaflets appear normal. There is no  evidence of stenosis, fluttering, or prolapse. There is trace mitral  regurgitation. There is no evidence of mitral valve prolapse. The  mitral valve area by the pressure half-time method is 3.4 cm^2. There  is no mitral valve stenosis. Aortic Valve: The aortic valve is not well visualized. No aortic  regurgitation is present. Aortic valve peak gradient is 6.4 mmHg. Aortic valve mean gradient is 3.6 mmHg. No hemodynamically  significant valvular aortic stenosis. Aortic Root: The aortic root is normal size. Tricuspid Valve: The tricuspid valve is normal in structure and  function. Trace tricuspid regurgitation is present. The right  ventricular systolic pressure is 13.3 mmHg. Right ventricular  systolic pressure is mildly elevated at 35-45 mmHg. Diastolic Function: Normal Diastolic Function. Pulmonic Valve/Pulmonary Artery:  The pulmonic valve is normal in  structure. There is trace pulmonic valvular regurgitation. Pericardium: There is no pericardial effusion. There is no pleural  effusion. GXT 2/22/16  Stress Data  Stress Protocol: Nakul. Stress Stage: 2. METs: 7. Protocol Time: 04:46 mm:ss. 2 Min HR Recovery: 23.5. Stressor: Beverlyn Calixtoer, R.N..  Response to Stress  Termination: Patient's request.  Symptoms at Rest: None. Stress Symptoms: Dyspnea. Symptom Resolution: Rest.  HR Range Rest - Peak:  bpm.  Maximum Heart Rate: 142 bpm.  APM HR: 87% attained. Rate Pressure Product: 86779 (target 25,000).   BP Range Rest-Peak: 828//93. Recovery Symptoms: None. Resting ECG: Normal Sinus Rhythm with no abnormalities. Normal ST-T  waves. BP Response: Normal blood pressure response. ECG at Peak Stress: No significant changes from baseline. No ischemic  ST depression. Arrhythmia: No arrhythmias. Assessment:  1. Palpitations  2. Essential Hypertension    Plan:  I think that Ms. Gordon  is entirely stable from a cardiovascular standpoint. I see no need to make any changes currently in her medical regimen or pursue further testing. Her blood pressure is well controlled today in the office. She can continue to take an extra dose of Cardizem as needed for breakthrough episodes. I have congratulated her on the recent weight loss and encouraged her to continue her aerobic activity. I have personally reviewed all previous testing for this visit today including imaging, lab results and EKG as detailed above. I will see her in office for follow up in 1 year per her request. I did offer that we can see her just on an as needed basis for her benign palpitations. This note was scribed in the presence of Sonali Rodriguez MD by Maren Velazquez RN. Physician Attestation:  The scribes documentation has been prepared under my direction and personally reviewed by me in its entirety. I, Dr. Izzy Botello personally performed the services described in this documentation as scribed by my RN in my presence, and I confirm that the note above accurately reflects all work, treatment, procedures, and medical decision making performed by me.

## 2021-06-11 ENCOUNTER — OFFICE VISIT (OUTPATIENT)
Dept: CARDIOLOGY CLINIC | Age: 61
End: 2021-06-11
Payer: MEDICAID

## 2021-06-11 VITALS
OXYGEN SATURATION: 98 % | BODY MASS INDEX: 43.19 KG/M2 | HEART RATE: 80 BPM | SYSTOLIC BLOOD PRESSURE: 136 MMHG | WEIGHT: 253 LBS | HEIGHT: 64 IN | DIASTOLIC BLOOD PRESSURE: 82 MMHG

## 2021-06-11 DIAGNOSIS — I10 ESSENTIAL HYPERTENSION: ICD-10-CM

## 2021-06-11 DIAGNOSIS — R00.2 PALPITATIONS: Primary | ICD-10-CM

## 2021-06-11 PROCEDURE — 99213 OFFICE O/P EST LOW 20 MIN: CPT | Performed by: INTERNAL MEDICINE

## 2021-06-11 PROCEDURE — G8417 CALC BMI ABV UP PARAM F/U: HCPCS | Performed by: INTERNAL MEDICINE

## 2021-06-11 PROCEDURE — G8427 DOCREV CUR MEDS BY ELIG CLIN: HCPCS | Performed by: INTERNAL MEDICINE

## 2021-06-11 PROCEDURE — 1036F TOBACCO NON-USER: CPT | Performed by: INTERNAL MEDICINE

## 2021-06-11 PROCEDURE — 3017F COLORECTAL CA SCREEN DOC REV: CPT | Performed by: INTERNAL MEDICINE

## 2021-06-11 PROCEDURE — 93000 ELECTROCARDIOGRAM COMPLETE: CPT | Performed by: INTERNAL MEDICINE

## 2021-08-10 ENCOUNTER — TELEPHONE (OUTPATIENT)
Dept: PULMONOLOGY | Age: 61
End: 2021-08-10

## 2021-08-10 NOTE — PROGRESS NOTES
Diane Vega         : 1960  [] [address]     [x] [address]      [] Maryam     [][facility]    [] Marianna Sergeant  [] [facility]   [] Other:  Diagnosis: [x] KATALINA (G47.33) [] CSA (G47.31) [] Apnea (G47.30)   Length of Need: [] 12 Months [x] 99 Months [] Other:    Machine (YOJANA!): [] Respironics Dream Station      Auto [x] ResMed AirSense     Auto [] Other:     [x]  CPAP () [] Bilevel ()   Mode: [x] Auto [] Spontaneous    Mode: [] Auto [] Spontaneous           New Between 10 and 14 cm  Has defective CPAP                 Comfort Settings:   - Ramp Pressure: 5 cmH2O                                        - Ramp time: 15 min                                     -  Flex/EPR - 3 full time                                    - For ResMed Bilevel (TiMax-4 sec   TiMin- 0.2 sec)     Humidifier: [x] Heated ()        [x] Water chamber replacement ()/ 1 per 6 months        Mask:  Please always start with the mask the patient used during the titraion   [x] Nasal () /1 per 3 months [x] Full Face () /1 per 3 months   [x] Patient choice -Size and fit mask [x] Patient Choice - Size and fit mask   [] Dispense:  [] Dispense:    [x] Headgear () / 1 per 3 months [x] Headgear () / 1 per 3 months   [x] Replacement Nasal Cushion ()/2 per month [x] Interface Replacement ()/1 per month   [x] Replacement Nasal Pillows ()/2 per month         Tubing: [x] Heated ()/1 per 3 months    [] Standard ()/1 per 3 months [] Other:           Filters: [x] Non-disposable ()/1 per 6 months     [x] Ultra-Fine, Disposable ()/2 per month        Miscellaneous: [x] Chin Strap ()/ 1 per 6 months [] O2 bleed-in:       LPM   [] Oximetry on CPAP/Bilevel []  Other:          Start Order Date: 08/10/21    MEDICAL JUSTIFICATION:  I, the undersigned, certify that the above prescribed supplies are medically necessary for this patients wellbeing.   In my opinion, the supplies are both reasonable and necessary in reference to accepted standards of medicalpractice in treatment of this patients condition.     Garett Mir MD      NPI: [id number]       Order Signed Date: 08/10/21    Electronically signed by Garett Mir MD on 8/10/2021 at 2:10 PM

## 2021-08-10 NOTE — TELEPHONE ENCOUNTER
Pt has a machine that has been recalled  She will call to register  Her machine was reading error , showed she was not compliant, she had to pay for machine and took chip out and put back in--she uses machine everynight  Care  states as of 1 4-21 it has not been connected

## 2021-09-14 ENCOUNTER — OFFICE VISIT (OUTPATIENT)
Dept: ORTHOPEDIC SURGERY | Age: 61
End: 2021-09-14
Payer: MEDICAID

## 2021-09-14 VITALS — BODY MASS INDEX: 43.59 KG/M2 | HEIGHT: 63 IN | WEIGHT: 246 LBS

## 2021-09-14 DIAGNOSIS — M25.561 CHRONIC PAIN OF RIGHT KNEE: ICD-10-CM

## 2021-09-14 DIAGNOSIS — G89.29 CHRONIC PAIN OF RIGHT KNEE: ICD-10-CM

## 2021-09-14 DIAGNOSIS — M17.12 PRIMARY OSTEOARTHRITIS OF LEFT KNEE: Primary | ICD-10-CM

## 2021-09-14 DIAGNOSIS — S80.01XA CONTUSION OF RIGHT KNEE, INITIAL ENCOUNTER: ICD-10-CM

## 2021-09-14 DIAGNOSIS — M17.11 PRIMARY OSTEOARTHRITIS OF RIGHT KNEE: ICD-10-CM

## 2021-09-14 PROCEDURE — 99214 OFFICE O/P EST MOD 30 MIN: CPT | Performed by: ORTHOPAEDIC SURGERY

## 2021-09-14 PROCEDURE — G8427 DOCREV CUR MEDS BY ELIG CLIN: HCPCS | Performed by: ORTHOPAEDIC SURGERY

## 2021-09-14 PROCEDURE — 3017F COLORECTAL CA SCREEN DOC REV: CPT | Performed by: ORTHOPAEDIC SURGERY

## 2021-09-14 PROCEDURE — 1036F TOBACCO NON-USER: CPT | Performed by: ORTHOPAEDIC SURGERY

## 2021-09-14 PROCEDURE — G8417 CALC BMI ABV UP PARAM F/U: HCPCS | Performed by: ORTHOPAEDIC SURGERY

## 2021-09-14 RX ORDER — IBUPROFEN 600 MG/1
600 TABLET ORAL 2 TIMES DAILY WITH MEALS
Qty: 60 TABLET | Refills: 1 | Status: SHIPPED | OUTPATIENT
Start: 2021-09-14 | End: 2021-11-11

## 2021-09-14 NOTE — PROGRESS NOTES
toes. Deep tendon reflexes of the lower extremities are normal and symmetric. Examination of the right knee reveals mild anteromedial tenderness. She has a small knee effusion. There is moderate to severe patellofemoral crepitus. Range of motion of the right knee is from 0 degrees to 115 degrees. Right knee strength is 4+ to 5/5. X-rays: 4 views of the left knee obtained in the office today were extensively reviewed. There is mild medial joint space narrowing. There is moderate patellofemoral arthritis. Impression: Left knee osteoarthritis #2 right knee osteoarthritis #3 right knee contusion    Plan: At this time, we will treat patient conservatively. We instructed the patient on a weight loss program.  We encouraged her to modify her activities. She will work on range of motion and strengthening. She declined an injection. The patient was given a prescription for ibuprofen 600 mg twice a day with food. She will follow-up with me in 3 months and we will reassess her then. If the patient continues to have severe pain, we will consider an injection.

## 2021-11-11 RX ORDER — IBUPROFEN 600 MG/1
TABLET ORAL
Qty: 60 TABLET | Refills: 1 | Status: SHIPPED | OUTPATIENT
Start: 2021-11-11 | End: 2022-01-10

## 2022-01-10 RX ORDER — IBUPROFEN 600 MG/1
TABLET ORAL
Qty: 60 TABLET | Refills: 1 | Status: SHIPPED | OUTPATIENT
Start: 2022-01-10 | End: 2022-04-25 | Stop reason: CLARIF

## 2022-03-22 NOTE — LETTER
[facility]  [address]  Phone: [phone]  Fax: [phone]    Remington Villalobos MD        April 1, 2019     Patient: Libia Gilmore   YOB: 1960   Date of Visit: 4/1/2019       To Whom It May Concern: It is my medical opinion that Libia Gilmore should remain out of work until 4/15/19. If you have any questions or concerns, please don't hesitate to call.     Sincerely,          Remington Villalobos MD 11

## 2022-04-08 ENCOUNTER — APPOINTMENT (OUTPATIENT)
Dept: CT IMAGING | Age: 62
End: 2022-04-08
Payer: MEDICAID

## 2022-04-08 ENCOUNTER — HOSPITAL ENCOUNTER (EMERGENCY)
Age: 62
Discharge: HOME | End: 2022-04-08
Attending: EMERGENCY MEDICINE
Payer: MEDICAID

## 2022-04-08 VITALS
SYSTOLIC BLOOD PRESSURE: 148 MMHG | WEIGHT: 258.82 LBS | HEIGHT: 63 IN | RESPIRATION RATE: 18 BRPM | TEMPERATURE: 97.9 F | DIASTOLIC BLOOD PRESSURE: 74 MMHG | HEART RATE: 91 BPM | BODY MASS INDEX: 45.86 KG/M2 | OXYGEN SATURATION: 99 %

## 2022-04-08 DIAGNOSIS — K76.0 FATTY LIVER: ICD-10-CM

## 2022-04-08 DIAGNOSIS — R10.31 ABDOMINAL PAIN, RIGHT LOWER QUADRANT: Primary | ICD-10-CM

## 2022-04-08 LAB
ALBUMIN SERPL-MCNC: 4.2 G/DL (ref 3.4–5)
ALP BLD-CCNC: 99 U/L (ref 40–129)
ALT SERPL-CCNC: 16 U/L (ref 10–40)
ANION GAP SERPL CALCULATED.3IONS-SCNC: 18 MMOL/L (ref 3–16)
AST SERPL-CCNC: 19 U/L (ref 15–37)
BASOPHILS ABSOLUTE: 0.1 K/UL (ref 0–0.2)
BASOPHILS RELATIVE PERCENT: 0.7 %
BILIRUB SERPL-MCNC: <0.2 MG/DL (ref 0–1)
BILIRUBIN DIRECT: <0.2 MG/DL (ref 0–0.3)
BILIRUBIN URINE: NEGATIVE
BILIRUBIN, INDIRECT: NORMAL MG/DL (ref 0–1)
BLOOD, URINE: NEGATIVE
BUN BLDV-MCNC: 15 MG/DL (ref 7–20)
CALCIUM SERPL-MCNC: 9.8 MG/DL (ref 8.3–10.6)
CHLORIDE BLD-SCNC: 102 MMOL/L (ref 99–110)
CLARITY: CLEAR
CO2: 22 MMOL/L (ref 21–32)
COLOR: YELLOW
CREAT SERPL-MCNC: 0.8 MG/DL (ref 0.6–1.2)
EOSINOPHILS ABSOLUTE: 0.3 K/UL (ref 0–0.6)
EOSINOPHILS RELATIVE PERCENT: 3.3 %
GFR AFRICAN AMERICAN: >60
GFR NON-AFRICAN AMERICAN: >60
GLUCOSE BLD-MCNC: 123 MG/DL (ref 70–99)
GLUCOSE URINE: NEGATIVE MG/DL
HCG(URINE) PREGNANCY TEST: NEGATIVE
HCT VFR BLD CALC: 37.1 % (ref 36–48)
HEMOGLOBIN: 12.2 G/DL (ref 12–16)
KETONES, URINE: NEGATIVE MG/DL
LEUKOCYTE ESTERASE, URINE: NEGATIVE
LIPASE: 28 U/L (ref 13–60)
LYMPHOCYTES ABSOLUTE: 1.9 K/UL (ref 1–5.1)
LYMPHOCYTES RELATIVE PERCENT: 18.3 %
MCH RBC QN AUTO: 25.4 PG (ref 26–34)
MCHC RBC AUTO-ENTMCNC: 32.8 G/DL (ref 31–36)
MCV RBC AUTO: 77.4 FL (ref 80–100)
MICROSCOPIC EXAMINATION: NORMAL
MONOCYTES ABSOLUTE: 0.5 K/UL (ref 0–1.3)
MONOCYTES RELATIVE PERCENT: 5 %
NEUTROPHILS ABSOLUTE: 7.5 K/UL (ref 1.7–7.7)
NEUTROPHILS RELATIVE PERCENT: 72.7 %
NITRITE, URINE: NEGATIVE
PDW BLD-RTO: 16 % (ref 12.4–15.4)
PH UA: 6 (ref 5–8)
PLATELET # BLD: 309 K/UL (ref 135–450)
PMV BLD AUTO: 9 FL (ref 5–10.5)
POTASSIUM REFLEX MAGNESIUM: 4.5 MMOL/L (ref 3.5–5.1)
PROTEIN UA: NEGATIVE MG/DL
RBC # BLD: 4.79 M/UL (ref 4–5.2)
SODIUM BLD-SCNC: 142 MMOL/L (ref 136–145)
SPECIFIC GRAVITY UA: >=1.03 (ref 1–1.03)
TOTAL PROTEIN: 7 G/DL (ref 6.4–8.2)
URINE REFLEX TO CULTURE: NORMAL
URINE TYPE: NORMAL
UROBILINOGEN, URINE: 0.2 E.U./DL
WBC # BLD: 10.2 K/UL (ref 4–11)

## 2022-04-08 PROCEDURE — 74176 CT ABD & PELVIS W/O CONTRAST: CPT

## 2022-04-08 PROCEDURE — 6360000002 HC RX W HCPCS

## 2022-04-08 PROCEDURE — 80048 BASIC METABOLIC PNL TOTAL CA: CPT

## 2022-04-08 PROCEDURE — 96374 THER/PROPH/DIAG INJ IV PUSH: CPT

## 2022-04-08 PROCEDURE — 36415 COLL VENOUS BLD VENIPUNCTURE: CPT

## 2022-04-08 PROCEDURE — 85025 COMPLETE CBC W/AUTO DIFF WBC: CPT

## 2022-04-08 PROCEDURE — 99284 EMERGENCY DEPT VISIT MOD MDM: CPT

## 2022-04-08 PROCEDURE — 83690 ASSAY OF LIPASE: CPT

## 2022-04-08 PROCEDURE — 2580000003 HC RX 258

## 2022-04-08 PROCEDURE — 84703 CHORIONIC GONADOTROPIN ASSAY: CPT

## 2022-04-08 PROCEDURE — 80076 HEPATIC FUNCTION PANEL: CPT

## 2022-04-08 PROCEDURE — 81003 URINALYSIS AUTO W/O SCOPE: CPT

## 2022-04-08 RX ORDER — SODIUM CHLORIDE, SODIUM LACTATE, POTASSIUM CHLORIDE, AND CALCIUM CHLORIDE .6; .31; .03; .02 G/100ML; G/100ML; G/100ML; G/100ML
1000 INJECTION, SOLUTION INTRAVENOUS ONCE
Status: COMPLETED | OUTPATIENT
Start: 2022-04-08 | End: 2022-04-08

## 2022-04-08 RX ORDER — KETOROLAC TROMETHAMINE 30 MG/ML
15 INJECTION, SOLUTION INTRAMUSCULAR; INTRAVENOUS ONCE
Status: COMPLETED | OUTPATIENT
Start: 2022-04-08 | End: 2022-04-08

## 2022-04-08 RX ADMIN — KETOROLAC TROMETHAMINE 15 MG: 30 INJECTION, SOLUTION INTRAMUSCULAR at 18:50

## 2022-04-08 RX ADMIN — SODIUM CHLORIDE, POTASSIUM CHLORIDE, SODIUM LACTATE AND CALCIUM CHLORIDE 1000 ML: 600; 310; 30; 20 INJECTION, SOLUTION INTRAVENOUS at 18:50

## 2022-04-08 ASSESSMENT — PAIN DESCRIPTION - FREQUENCY: FREQUENCY: INTERMITTENT

## 2022-04-08 ASSESSMENT — PAIN DESCRIPTION - DESCRIPTORS: DESCRIPTORS: ACHING

## 2022-04-08 ASSESSMENT — ENCOUNTER SYMPTOMS
NAUSEA: 0
SHORTNESS OF BREATH: 0
DIARRHEA: 1
RHINORRHEA: 0
ABDOMINAL PAIN: 1
CONSTIPATION: 0
SORE THROAT: 0
VOMITING: 0
COUGH: 0
EYE PAIN: 0
BACK PAIN: 0

## 2022-04-08 ASSESSMENT — PAIN SCALES - GENERAL
PAINLEVEL_OUTOF10: 3
PAINLEVEL_OUTOF10: 2

## 2022-04-08 ASSESSMENT — PAIN DESCRIPTION - ORIENTATION: ORIENTATION: RIGHT

## 2022-04-08 ASSESSMENT — PAIN - FUNCTIONAL ASSESSMENT
PAIN_FUNCTIONAL_ASSESSMENT: 0-10

## 2022-04-08 ASSESSMENT — PAIN DESCRIPTION - PAIN TYPE: TYPE: ACUTE PAIN

## 2022-04-08 ASSESSMENT — PAIN DESCRIPTION - LOCATION: LOCATION: ABDOMEN;PELVIS

## 2022-04-08 NOTE — ED PROVIDER NOTES
[facility] ENCOUNTER        Pt Name: Jose Ariza  MRN: [id number]  [place] 1960  Date of evaluation: 4/8/2022  Provider: FELIX Javier - CNP  PCP: Claudene Lux, MD  Note Started: 6:12 PM EDT       I have seen and evaluated this patient with my supervising physician Aryan Correa MD.      Chad YOO 48.       Chief Complaint   Patient presents with    Pelvic Pain     x1 days. states from upper right leg into groin. HISTORY OF PRESENT ILLNESS   (Location/Symptom, Timing/Onset, Context/Setting, Quality, Duration, Modifying Factors, Severity)  Note limiting factors. Jose Ariza is a 57 y.o. female who presents ED reporting right lower quadrant pain ongoing for the past 24 hours. Pain is worse with bending over. Pain is described as dull and pressure. Pain associated with some diarrhea. No nausea or vomiting. Patient has had a prior abdominal surgery for oophorectomy but does have her uterus. She also still has her gallbladder and appendix. Nursing Notes were all reviewed and agreed with or any disagreements were addressed in the HPI. REVIEW OF SYSTEMS    (2-9 systems for level 4, 10 or more for level 5)     Review of Systems   Constitutional: Negative for chills, diaphoresis and fever. HENT: Negative for congestion, rhinorrhea and sore throat. Eyes: Negative for pain and visual disturbance. Respiratory: Negative for cough and shortness of breath. Cardiovascular: Negative for chest pain and leg swelling. Gastrointestinal: Positive for abdominal pain and diarrhea. Negative for constipation, nausea and vomiting. Genitourinary: Negative for dysuria, flank pain, frequency, hematuria, urgency, vaginal bleeding and vaginal discharge. Musculoskeletal: Negative for back pain and neck pain. Skin: Negative for rash and wound.    Neurological: Negative for dizziness and light-headedness.        PAST MEDICAL HISTORY     Past Medical History:   Diagnosis Date    Acid reflux     Arthritis     knees    Heart palpitations 2016    Osteoarthritis     Sleep apnea     Vitamin D deficiency        SURGICAL HISTORY     Past Surgical History:   Procedure Laterality Date    BREAST SURGERY      biopsy-lump removed    DILATION AND CURETTAGE OF UTERUS      PARTIAL HYSTERECTOMY Left     ovarian tumor     TUBAL LIGATION         CURRENTMEDICATIONS       Discharge Medication List as of 4/8/2022  8:57 PM      CONTINUE these medications which have NOT CHANGED    Details   ibuprofen (ADVIL;MOTRIN) 600 MG tablet TAKE 1 TABLET BY MOUTH TWICE DAILY WITH MEALS, Disp-60 tablet, R-1Normal      omeprazole (PRILOSEC) 20 MG delayed release capsule TAKE 2 CAPSULES BY MOUTH DAILY, Disp-60 capsule, R-5Normal      dilTIAZem (CARDIZEM CD) 240 MG extended release capsule TAKE 1 CAPSULE BY MOUTH DAILY, Disp-30 capsule, R-5Normal      Probiotic Product (PROBIOTIC PO) Take by mouth dailyHistorical Med      Cholecalciferol (MAXIMUM D3) 325 MCG (23715 UT) CAPS Take 13,000 Units by mouth twice a week Lot # [id number]  [id number]   Qty: 2 Boxes, Disp-2 capsule, R-0Sample      zinc oxide 13 % CREA Apply topically 2 times daily as needed for Rash Prn yeast infections, Topical, 2 TIMES DAILY PRN, Historical Med      acetaminophen (TYLENOL) 500 MG tablet Take 1,000 mg by mouth every 6 hours as needed for Pain Historical Med             ALLERGIES     Sulfa antibiotics    FAMILYHISTORY       Family History   Problem Relation Age of Onset    Cancer Father     High Blood Pressure Father     High Cholesterol Father     Cancer Maternal Grandmother     Arthritis Mother     High Blood Pressure Mother         SOCIAL HISTORY       Social History     Socioeconomic History    Marital status: Single     Spouse name: None    Number of children: None    Years of education: None    Highest education level: None Occupational History    Occupation: [employer]     Comment: [employer]i [employer] Occupation:    Tobacco Use    Smoking status: Never Smoker    Smokeless tobacco: Never Used   Vaping Use    Vaping Use: Never used   Substance and Sexual Activity    Alcohol use: No    Drug use: No    Sexual activity: Not Currently   Other Topics Concern    None   Social History Narrative    Caffeine:        SODA - 3 cans a day          TEA - 0        COFFEE - 0     Social Determinants of Health     Financial Resource Strain:     Difficulty of Paying Living Expenses: Not on file   Food Insecurity: No Food Insecurity    Worried About Running Out of Food in the Last Year: Never true    [place] of Food in the Last Year: Never true   Transportation Needs:     Lack of Transportation (Medical): Not on file    Lack of Transportation (Non-Medical):  Not on file   Physical Activity:     Days of Exercise per Week: Not on file    Minutes of Exercise per Session: Not on file   Stress:     Feeling of Stress : Not on file   Social Connections:     Frequency of Communication with Friends and Family: Not on file    Frequency of Social Gatherings with Friends and Family: Not on file    Attends Latter-day Services: Not on file    Active Member of [address] [place] [address] or Organizations: Not on file    Attends Club or Organization Meetings: Not on file    Marital Status: Not on file   Intimate Partner Violence:     Fear of Current or Ex-Partner: Not on file    Emotionally Abused: Not on file    Physically Abused: Not on file    Sexually Abused: Not on file   Housing Stability:     Unable to Pay for Housing in the Last Year: Not on file    Number of [place] in the Last Year: Not on file    Unstable Housing in the Last Year: Not on file       SCREENINGS             PHYSICAL EXAM    (up to 7 for level 4, 8 or more for level 5)     ED Triage Vitals [04/08/22 1751]   BP Temp Temp Source Pulse Resp SpO2 Height Weight   (!) 173/85 97.9 °F (36.6 °C) Oral 98 19 94 % 5' 3\" (1.6 m) 258 lb 13.1 oz (117.4 kg)       Physical Exam  Vitals and nursing note reviewed. Constitutional:       General: She is not in acute distress. Appearance: Normal appearance. She is morbidly obese. She is ill-appearing. She is not toxic-appearing or diaphoretic. HENT:      Head: Normocephalic and atraumatic. Right Ear: External ear normal.      Left Ear: External ear normal.      Nose: Nose normal. No congestion or rhinorrhea. Mouth/Throat:      Mouth: Mucous membranes are moist.      Pharynx: Oropharynx is clear. No oropharyngeal exudate. Eyes:      General: No scleral icterus. Right eye: No discharge. Left eye: No discharge. Extraocular Movements: Extraocular movements intact. Conjunctiva/sclera: Conjunctivae normal.      Pupils: Pupils are equal, round, and reactive to light. Cardiovascular:      Rate and Rhythm: Normal rate and regular rhythm. Pulses: Normal pulses. Heart sounds: Normal heart sounds. No murmur heard. No friction rub. No gallop. Pulmonary:      Effort: Pulmonary effort is normal. No respiratory distress. Breath sounds: Normal breath sounds. No stridor. No wheezing, rhonchi or rales. Abdominal:      General: Bowel sounds are normal.      Palpations: Abdomen is soft. Tenderness: There is abdominal tenderness in the right lower quadrant and suprapubic area. There is no right CVA tenderness, left CVA tenderness or guarding. Negative signs include Martinez's sign and McBurney's sign. Musculoskeletal:         General: No swelling. Normal range of motion. Cervical back: Normal range of motion and neck supple. No rigidity. Right lower leg: No edema. Left lower leg: No edema. Skin:     General: Skin is warm and dry. Capillary Refill: Capillary refill takes less than 2 seconds. Coloration: Skin is not jaundiced or pale. Findings: No rash.    Neurological: General: No focal deficit present. Mental Status: She is alert and oriented to person, place, and time. Mental status is at baseline. Psychiatric:         Mood and Affect: Mood normal.         Behavior: Behavior normal.         DIAGNOSTIC RESULTS   LABS:    Labs Reviewed   CBC WITH AUTO DIFFERENTIAL - Abnormal; Notable for the following components:       Result Value    MCV 77.4 (*)     MCH 25.4 (*)     RDW 16.0 (*)     All other components within normal limits   BASIC METABOLIC PANEL W/ REFLEX TO MG FOR LOW K - Abnormal; Notable for the following components:    Anion Gap 18 (*)     Glucose 123 (*)     All other components within normal limits   HEPATIC FUNCTION PANEL   LIPASE   URINALYSIS WITH REFLEX TO CULTURE   PREGNANCY, URINE       When ordered, only abnormal lab results are displayed. All other labs were within normal range or not returned as of this dictation. EKG: When ordered, EKG's are interpreted by the Emergency Department Physician in the absence of a cardiologist.  Please see their note for interpretation of EKG. RADIOLOGY:   Non-plain film images such as CT, Ultrasound and MRI are read by the radiologist. Plain radiographic images are visualized andpreliminarily interpreted by the  ED Provider with the below findings:        Interpretation perthe Radiologist below, if available at the time of this note:    CT ABDOMEN PELVIS WO CONTRAST Additional Contrast? None   Final Result   Hepatic steatosis. Otherwise, unremarkable unenhanced CT examination of the   abdomen and pelvis, without finding to account for the patient's symptoms. No results found. PROCEDURES   Unless otherwise noted below, none     Procedures    CRITICAL CARE TIME   Total Critical Care time was 10 minutes, excluding separately reportable procedures. There was a high probability of clinically significant/life threatening deterioration in the patient's condition which required my urgent intervention.   This time spent assessing, reassessing patient, chart review and discussing case with other providers      CONSULTS:  None      EMERGENCY DEPARTMENT COURSE and DIFFERENTIAL DIAGNOSIS/MDM:   Vitals:    Vitals:    04/08/22 1751 04/08/22 2018   BP: (!) 173/85 (!) 148/74   Pulse: 98 91   Resp: 19 18   Temp: 97.9 °F (36.6 °C)    TempSrc: Oral    SpO2: 94% 99%   Weight: 258 lb 13.1 oz (117.4 kg)    Height: 5' 3\" (1.6 m)        Patient was given thefollowing medications:  Medications   lactated ringers bolus (0 mLs IntraVENous Stopped 4/8/22 2009)   ketorolac (TORADOL) injection 15 mg (15 mg IntraVENous Given 4/8/22 1850)           26-year-old female presenting with roughly 24 hours of abdominal pain to the right lower quadrant. Presentation HPI. Differential diagnosis: Abdominal Aortic Aneurysm, Ischemic Bowel, Bowel Obstruction, Appendicitis, Diverticulitis, Pyelonephritis, UTI, STD, Ureterolithiasis, Colitis, Gonad Torsion, other    PE as above. Concerning for some tenderness to the right lower quad. Labs:  Ordered reviewed CBC, BMP, hepatic panel, lipase. Urinalysis with concern for cystitis. CBC consistent with prior results on 10/2020. BMP with minimal hyperglycemia 123. Hepatic panel without transaminase elevation  Lipase 28. CT of the abdomen pelvis with contrast ordered which reveals hepatic steatosis but no acute findings    Patient was given a dose of Toradol and LR and reports excellent symptom relief    At this time, the patient is feeling better with a benign serial examination. Presentation is not consistent with acute abdomen. Based on history, physical exam, risk factors, and diagnostics, my suspicion for disease processes including but not limited to bowel obstruction, acute pancreatitis, abscess, perforated viscous, diverticulitis, cholecystis, and appendicitis is very low. As such, the patient is deemed appropriate for outpatient management with close PCP follow up in several days.  Usual strict

## 2022-04-08 NOTE — ED NOTES
Handoff report to Som Deng RN to assume care. Denies further questions.      Tabitha Dick RN  04/08/22 3489

## 2022-04-08 NOTE — ED TRIAGE NOTES
Pt presents to ED ambulatory with c/o of pelvic pain x1 day. States from upper right leg into groin. Reports has had this pain before but was \"gas pain. \" Ludin Zelaya. Resp even and unlabored. A/ox4. No acute distress noted. Denies any need at this time. Call light within reach. Bed in lowest position. Will continue to monitor.

## 2022-04-09 NOTE — ED NOTES
4/8/22 9440  Pt on the phone and asking about her CT abdomen results. Dr Razia Olson here and talked to pt on phone.   Answered her many questions     Tatianna Garner RN  04/08/22 4465

## 2022-04-09 NOTE — ED PROVIDER NOTES
In addition to the advanced practice provider, I personally saw Jose Ariza and performed a substantive portion of the visit including all aspects of the medical decision making. Briefly, this is a 57 y.o. female here for to the ER for evaluation of right lower quadrant pain, prior history of partial hysterectomy and salpingectomy, no active chest pain pressure acute shortness of breath, no nausea or vomiting. Afebrile. On exam, minimal tenderness of right lower quadrant no rebound or guarding protuberant abdomen, obese          Screenings            MDM  Patient presents ER for evaluation of right lower quadrant tenderness, no evidence of acute hepatitis pancreatitis normal white count, no evidence for acute appendicitis, fatty liver, Rashad Ours was noted, she will be initiated on Metformin discussed need for weight management outpatient follow-up with PCP and return if worse or new symptom      Patient Referrals:  Claudene Lux, [address] [place] [address]  [phone]            Discharge Medications:  Discharge Medication List as of 4/8/2022  8:57 PM      START taking these medications    Details   metFORMIN (GLUCOPHAGE) 500 MG tablet Take 1 tablet by mouth 2 times daily (with meals), Disp-180 tablet, R-1Print             FINAL IMPRESSION  1. Abdominal pain, right lower quadrant    2. Fatty liver        Blood pressure (!) 148/74, pulse 91, temperature 97.9 °F (36.6 °C), temperature source Oral, resp. rate 18, height 5' 3\" (1.6 m), weight 258 lb 13.1 oz (117.4 kg), last menstrual period 08/04/2015, SpO2 99 %, not currently breastfeeding.      For further details of [facility] emergency department encounter, please see documentation by advanced practice provider,     Landy Ahn MD (electronically signed)  Attending Emergency Physician      Aryan Correa MD  39/56/59 [phone]       Aryan Correa MD  76/43/49 [phone]

## 2022-06-14 ENCOUNTER — OFFICE VISIT (OUTPATIENT)
Dept: CARDIOLOGY CLINIC | Age: 62
End: 2022-06-14
Payer: MEDICAID

## 2022-06-14 VITALS
HEART RATE: 79 BPM | OXYGEN SATURATION: 98 % | HEIGHT: 63 IN | SYSTOLIC BLOOD PRESSURE: 130 MMHG | DIASTOLIC BLOOD PRESSURE: 76 MMHG | BODY MASS INDEX: 45.5 KG/M2 | WEIGHT: 256.8 LBS

## 2022-06-14 DIAGNOSIS — I10 ESSENTIAL HYPERTENSION: ICD-10-CM

## 2022-06-14 DIAGNOSIS — R00.2 PALPITATIONS: Primary | ICD-10-CM

## 2022-06-14 PROCEDURE — G8417 CALC BMI ABV UP PARAM F/U: HCPCS | Performed by: INTERNAL MEDICINE

## 2022-06-14 PROCEDURE — 3017F COLORECTAL CA SCREEN DOC REV: CPT | Performed by: INTERNAL MEDICINE

## 2022-06-14 PROCEDURE — G8427 DOCREV CUR MEDS BY ELIG CLIN: HCPCS | Performed by: INTERNAL MEDICINE

## 2022-06-14 PROCEDURE — 1036F TOBACCO NON-USER: CPT | Performed by: INTERNAL MEDICINE

## 2022-06-14 PROCEDURE — 99213 OFFICE O/P EST LOW 20 MIN: CPT | Performed by: INTERNAL MEDICINE

## 2022-06-14 PROCEDURE — 93000 ELECTROCARDIOGRAM COMPLETE: CPT | Performed by: INTERNAL MEDICINE

## 2022-06-14 NOTE — PATIENT INSTRUCTIONS
Patient Education        Heart-Healthy Diet: Care Instructions  Your Care Instructions     A heart-healthy diet has lots of vegetables, fruits, nuts, beans, and whole grains, and is low in salt. It limits foods that are high in saturated fat, such as meats, cheeses, and fried foods. It may be hard to change your diet,but even small changes can lower your risk of heart attack and heart disease. Follow-up care is a key part of your treatment and safety. Be sure to make and go to all appointments, and call your doctor if you are having problems. It's also a good idea to know your test results and keep alist of the medicines you take. How can you care for yourself at home? Watch your portions   Use food labels to learn what the recommended servings are for the foods you eat.  Eat only the number of calories you need to stay at a healthy weight. If you need to lose weight, eat fewer calories than your body burns (through exercise and other physical activity). Eat more fruits and vegetables   Eat a variety of fruit and vegetables every day. Dark green, deep orange, red, or yellow fruits and vegetables are especially good for you. Examples include spinach, carrots, peaches, and berries.  Keep carrots, celery, and other veggies handy for snacks. Buy fruit that is in season and store it where you can see it so that you will be tempted to eat it.  Cook dishes that have a lot of veggies in them, such as stir-fries and soups. Limit saturated fat   Read food labels, and try to avoid saturated fats. They increase your risk of heart disease.  Use olive or canola oil when you cook.  Bake, broil, grill, or steam foods instead of frying them.  Choose lean meats instead of high-fat meats such as hot dogs and sausages. Cut off all visible fat when you prepare meat.  Eat fish, skinless poultry, and meat alternatives such as soy products instead of high-fat meats.  Soy products, such as tofu, may be especially good for your heart.  Choose low-fat or fat-free milk and dairy products. Eat foods high in fiber   Eat a variety of grain products every day. Include whole-grain foods that have lots of fiber and nutrients. Examples of whole-grain foods include oats, whole wheat bread, and brown rice.  Buy whole-grain breads and cereals, instead of white bread or pastries. Limit salt and sodium   Limit how much salt and sodium you eat to help lower your blood pressure.  Taste food before you salt it. Add only a little salt when you think you need it. With time, your taste buds will adjust to less salt.  Eat fewer snack items, fast foods, and other high-salt, processed foods. Check food labels for the amount of sodium in packaged foods.  Choose low-sodium versions of canned goods (such as soups, vegetables, and beans). Limit sugar   Limit drinks and foods with added sugar. These include candy, desserts, and soda pop. Limit alcohol   Limit alcohol to no more than 2 drinks a day for men and 1 drink a day for women. Too much alcohol can cause health problems. When should you call for help? Watch closely for changes in your health, and be sure to contact your doctor if:     You would like help planning heart-healthy meals. Where can you learn more? Go to https://[employer].eb.healthubigrate. org and sign in to your [employer] account. Enter V137 in the [facility] box to learn more about \"Heart-Healthy Diet: Care Instructions. \"     If you do not have an account, please click on the \"Sign Up Now\" link. Current as of: September 8, 2021               Content Version: 13.2  © 1769-2014 [employer]. Care instructions adapted under license by [facility] ([facility]). If you have questions about a medical condition or this instruction, always ask your healthcare professional. [place] [address] any warranty or liability for your use of this information.

## 2022-06-14 NOTE — PROGRESS NOTES
[facility]    Samantha Gordon  1960 June 14, 2022    CC: \"I'm stressed with my mom\"     HPI: The patient is 60 y.o. female with a past medical history significant for palpitations (years) and hypertension. She presents today for follow up. She reports personal stress with caring for her mother. She denies cardiac complaints. She has been walking her dog for exercise and denies exertional symptoms. Her breathing has been comfortable without shortness of breath. She reports rare palpitations but has not experienced these in several months. Patient denies exertional chest pain/pressure, dyspnea at rest, [place], PND, orthopnea, palpitations, lightheadedness, weight changes, changes in LE edema, and syncope. She reports medication compliance and is tolerating them. Review of Systems:  Constitutional: No fatigue, weakness, night sweats or fever. HEENT: No new vision difficulties or ringing in the ears. Respiratory: No new SOB, PND, orthopnea or cough. Cardiovascular: See HPI   GI: No n/v, diarrhea, constipation, abdominal pain or changes in bowel habits. No melena, no hematochezia  : No urinary frequency, urgency, incontinence, hematuria or dysuria. Skin: No cyanosis or skin lesions. Musculoskeletal: No new muscle or joint pain. Neurological: No syncope or TIA-like symptoms.   Psychiatric: No anxiety, insomnia or depression      Allergies   Allergen Reactions    Sulfa Antibiotics      Current Outpatient Medications   Medication Sig Dispense Refill    dilTIAZem (CARTIA XT) 240 MG extended release capsule TAKE 1 CAPSULE BY MOUTH DAILY 90 capsule 1    omeprazole (PRILOSEC) 20 MG delayed release capsule Take 2 capsules by mouth daily (Patient taking differently: Take 20 mg by mouth daily ) 180 capsule 1    Probiotic Product (PROBIOTIC PO) Take by mouth daily      Cholecalciferol (MAXIMUM D3) 325 MCG (03900 UT) CAPS Take 13,000 Units by mouth twice a week Lot # [id number]   Qty: 2 Boxes 2 capsule 0     No current facility-administered medications for this visit. Physical Exam:   Vitals:    06/14/22 1305   BP: 130/76   Pulse: 79   SpO2: 98%     Wt Readings from Last 2 Encounters:   06/14/22 256 lb 12.8 oz (116.5 kg)   04/25/22 252 lb 12.8 oz (114.7 kg)     Constitutional: She is oriented to person, place, and time. She appears well-developed and well-nourished. In no acute distress. Head: Normocephalic and atraumatic. Neck: Neck supple. No JVD present. Carotid bruit is not present. No mass and no thyromegaly present. No lymphadenopathy present. Cardiovascular: Normal rate, regular rhythm, normal heart sounds and intact distal pulses. Exam reveals no gallop and no friction rub. No murmur heard. Pulmonary/Chest: Effort normal and breath sounds normal. No respiratory distress. She has no wheezes, rhonchi or rales. Abdominal: Soft, non-tender. Bowel sounds and aorta are normal. She exhibits no organomegaly, mass or bruit. Extremities: No edema, cyanosis, or clubbing. Pulses are 2+ radial/carotid/dorsalis pedis and posterior tibial bilaterally. Neurological: She is alert and oriented to person, place, and time. She has normal reflexes. No cranial nerve deficit. Coordination normal.   Skin: Skin is warm and dry. There is no rash or diaphoresis. Psychiatric: She has a normal mood and affect. Her speech is normal and behavior is normal.     Personally reviewed and interpreted   EKG Interpretation 2/28/19: Sinus rhythm, QTc 426ms  EKG Interpretation 6/11/21: Sinus rhythm with normal intervals and axis  EKG Interpretation 6/14/22: Sinus rhythm       Imaging:     Echo 2/22/16  Left Ventricle: Overall left ventricular ejection fraction is  estimated to be 55-60%. The left ventricle is normal in size. There  is normal left ventricular wall thickness. The left ventricular wall  motion is normal. No left ventricular thrombus detected.   Right Ventricle: The right ventricle is normal size. There is normal  right ventricular wall thickness. The right ventricular systolic  function is normal.  Left Atrium: The left atrial size is normal.  Right Atrium: Right atrial size is normal.  Mitral Valve: The mitral valve leaflets appear normal. There is no  evidence of stenosis, fluttering, or prolapse. There is trace mitral  regurgitation. There is no evidence of mitral valve prolapse. The  mitral valve area by the pressure half-time method is 3.4 cm^2. There  is no mitral valve stenosis. Aortic Valve: The aortic valve is not well visualized. No aortic  regurgitation is present. Aortic valve peak gradient is 6.4 mmHg. Aortic valve mean gradient is 3.6 mmHg. No hemodynamically  significant valvular aortic stenosis. Aortic Root: The aortic root is normal size. Tricuspid Valve: The tricuspid valve is normal in structure and  function. Trace tricuspid regurgitation is present. The right  ventricular systolic pressure is 12.8 mmHg. Right ventricular  systolic pressure is mildly elevated at 35-45 mmHg. Diastolic Function: Normal Diastolic Function. Pulmonic Valve/Pulmonary Artery:  The pulmonic valve is normal in  structure. There is trace pulmonic valvular regurgitation. Pericardium: There is no pericardial effusion. There is no pleural  effusion. GXT 2/22/16  Stress Data  Stress Protocol: Nakul. Stress Stage: 2. METs: 7. Protocol Time: 04:46 mm:ss. 2 Min HR Recovery: 23.5. Stressor: Janet Ripple, R.N..  Response to Stress  Termination: Patient's request.  Symptoms at Rest: None. Stress Symptoms: Dyspnea. Symptom Resolution: Rest.  HR Range Rest - Peak:  bpm.  Maximum Heart Rate: 142 bpm.  APM HR: 87% attained. Rate Pressure Product: 57961 (target 25,000). BP Range Rest-Peak: 142//80. Recovery Symptoms: None. Resting ECG: Normal Sinus Rhythm with no abnormalities. Normal ST-T  waves. BP Response: Normal blood pressure response.   ECG at Peak Stress: No significant changes from baseline. No ischemic  ST depression. Arrhythmia: No arrhythmias. Assessment:  1. Palpitations  2. Essential Hypertension    Plan:  I think that Ms. Gordon  is stable from a cardiovascular standpoint. I see no need to make any changes currently in her medical regimen or pursue further testing. Her blood pressure is well controlled and she denies cardiac complaints. She will remain on her current medical therapy. I have encouraged her in risk factor modification including participating in aerobic activity and adhering to a heart healthy diet. I can see her at any time for new cardiac complaints. This note was scribed in the presence of Sonu Ridley MD by [employer], RN. Physician Attestation:  The scribes documentation has been prepared under my direction and personally reviewed by me in its entirety. I, Dr. Lesia Hughes personally performed the services described in this documentation as scribed by my RN in my presence, and I confirm that the note above accurately reflects all work, treatment, procedures, and medical decision making performed by me.

## 2024-02-04 DIAGNOSIS — R00.2 HEART PALPITATIONS: ICD-10-CM

## 2024-02-05 RX ORDER — DILTIAZEM HYDROCHLORIDE 240 MG/1
240 CAPSULE, COATED, EXTENDED RELEASE ORAL DAILY
Qty: 30 CAPSULE | Refills: 3 | Status: SHIPPED | OUTPATIENT
Start: 2024-02-05

## 2024-02-05 NOTE — TELEPHONE ENCOUNTER
Requested Prescriptions     Pending Prescriptions Disp Refills    dilTIAZem (CARDIZEM CD) 240 MG extended release capsule [Pharmacy Med Name: DILTIAZEM CD 240MG CAPSULES (24 HR)] 30 capsule 3     Sig: Take 1 capsule by mouth daily          Number: 30    Refills: 3    Last Office Visit: 6/14/2022     Next Office Visit: None scheduled     Last Refill: 11/2/2023    Last Labs: 04/08/2022

## 2024-03-02 DIAGNOSIS — R00.2 HEART PALPITATIONS: ICD-10-CM

## 2024-03-04 RX ORDER — DILTIAZEM HYDROCHLORIDE 240 MG/1
240 CAPSULE, COATED, EXTENDED RELEASE ORAL DAILY
Qty: 30 CAPSULE | Refills: 3 | OUTPATIENT
Start: 2024-03-04

## 2024-05-12 DIAGNOSIS — R00.2 HEART PALPITATIONS: ICD-10-CM

## 2024-05-13 ENCOUNTER — TELEPHONE (OUTPATIENT)
Dept: CARDIOLOGY CLINIC | Age: 64
End: 2024-05-13

## 2024-05-13 RX ORDER — DILTIAZEM HYDROCHLORIDE 240 MG/1
240 CAPSULE, COATED, EXTENDED RELEASE ORAL DAILY
Qty: 30 CAPSULE | Refills: 3 | OUTPATIENT
Start: 2024-05-13

## 2024-05-13 NOTE — TELEPHONE ENCOUNTER
When she saw Dr. Santos 6/14/2022 it was changed to as needed only. She was to continue to follow with her PCP and her PCP would fill her script moving forward. Thanks.

## 2024-05-13 NOTE — TELEPHONE ENCOUNTER
Geno called in asking why she doesn't need to be seen yearly anymore? Geno shared when she went to refill her - Diltiazem - 240 MG - she was told meds were refused and she is no longer under Tom's care. Geno had to get this medication filled by PCP.     Geno is wondering if Tom still her cardiologist, or she will have to find another.     Please assist.    Geno's callback: [phone]

## 2024-09-10 ENCOUNTER — OFFICE VISIT (OUTPATIENT)
Age: 64
End: 2024-09-10

## 2024-09-10 VITALS
TEMPERATURE: 97.9 F | SYSTOLIC BLOOD PRESSURE: 162 MMHG | HEART RATE: 81 BPM | OXYGEN SATURATION: 97 % | BODY MASS INDEX: 44.9 KG/M2 | HEIGHT: 64 IN | WEIGHT: 263 LBS | DIASTOLIC BLOOD PRESSURE: 77 MMHG

## 2024-09-10 DIAGNOSIS — S90.111A CONTUSION OF RIGHT GREAT TOE WITHOUT DAMAGE TO NAIL, INITIAL ENCOUNTER: Primary | ICD-10-CM

## 2024-09-10 DIAGNOSIS — M79.671 RIGHT FOOT PAIN: ICD-10-CM

## 2024-09-11 ASSESSMENT — ENCOUNTER SYMPTOMS
VOMITING: 0
ABDOMINAL PAIN: 0
CONSTIPATION: 0
COUGH: 0
NAUSEA: 0
SHORTNESS OF BREATH: 0
CHEST TIGHTNESS: 0
DIARRHEA: 0

## 2024-10-03 ENCOUNTER — HOSPITAL ENCOUNTER (OUTPATIENT)
Dept: GENERAL RADIOLOGY | Age: 64
Discharge: HOME | End: 2024-10-03
Payer: COMMERCIAL

## 2024-10-03 ENCOUNTER — HOSPITAL ENCOUNTER (OUTPATIENT)
Age: 64
Discharge: HOME | End: 2024-10-03
Payer: COMMERCIAL

## 2024-10-03 DIAGNOSIS — S92.414D CLOSED NONDISPLACED FRACTURE OF PROXIMAL PHALANX OF RIGHT GREAT TOE WITH ROUTINE HEALING, SUBSEQUENT ENCOUNTER: ICD-10-CM

## 2024-10-03 PROCEDURE — 73630 X-RAY EXAM OF FOOT: CPT
